# Patient Record
Sex: FEMALE | URBAN - METROPOLITAN AREA
[De-identification: names, ages, dates, MRNs, and addresses within clinical notes are randomized per-mention and may not be internally consistent; named-entity substitution may affect disease eponyms.]

---

## 2018-07-05 ENCOUNTER — OFFICE VISIT CONVERTED (OUTPATIENT)
Dept: ONCOLOGY | Facility: HOSPITAL | Age: 63
End: 2018-07-05
Attending: INTERNAL MEDICINE

## 2018-07-12 ENCOUNTER — OFFICE VISIT CONVERTED (OUTPATIENT)
Dept: ONCOLOGY | Facility: HOSPITAL | Age: 63
End: 2018-07-12
Attending: INTERNAL MEDICINE

## 2019-01-02 ENCOUNTER — HOSPITAL ENCOUNTER (OUTPATIENT)
Dept: URGENT CARE | Facility: CLINIC | Age: 64
Discharge: HOME | End: 2019-01-02
Attending: NURSE PRACTITIONER

## 2019-01-07 ENCOUNTER — HOSPITAL ENCOUNTER (OUTPATIENT)
Dept: URGENT CARE | Facility: CLINIC | Age: 64
Discharge: HOME | End: 2019-01-07

## 2019-02-06 ENCOUNTER — OFFICE VISIT CONVERTED (OUTPATIENT)
Dept: FAMILY MEDICINE CLINIC | Facility: CLINIC | Age: 64
End: 2019-02-06
Attending: FAMILY MEDICINE

## 2019-02-12 ENCOUNTER — CONVERSION ENCOUNTER (OUTPATIENT)
Dept: FAMILY MEDICINE CLINIC | Facility: CLINIC | Age: 64
End: 2019-02-12

## 2019-02-12 ENCOUNTER — OFFICE VISIT CONVERTED (OUTPATIENT)
Dept: FAMILY MEDICINE CLINIC | Facility: CLINIC | Age: 64
End: 2019-02-12
Attending: FAMILY MEDICINE

## 2019-04-10 ENCOUNTER — HOSPITAL ENCOUNTER (OUTPATIENT)
Dept: FAMILY MEDICINE CLINIC | Facility: CLINIC | Age: 64
Discharge: HOME | End: 2019-04-10
Attending: FAMILY MEDICINE

## 2019-04-10 LAB
ALBUMIN SERPL-MCNC: 4.5 G/DL (ref 3.5–5)
ALBUMIN/GLOB SERPL: 1.3 {RATIO} (ref 1.4–2.6)
ALP SERPL-CCNC: 76 U/L (ref 43–160)
ALT SERPL-CCNC: 17 U/L (ref 10–40)
ANION GAP SERPL CALC-SCNC: 15 MMOL/L (ref 8–19)
AST SERPL-CCNC: 17 U/L (ref 15–50)
BASOPHILS # BLD AUTO: 0.05 10*3/UL (ref 0–0.2)
BASOPHILS NFR BLD AUTO: 0.5 % (ref 0–3)
BILIRUB SERPL-MCNC: 0.32 MG/DL (ref 0.2–1.3)
BUN SERPL-MCNC: 13 MG/DL (ref 5–25)
BUN/CREAT SERPL: 17 {RATIO} (ref 6–20)
CALCIUM SERPL-MCNC: 10 MG/DL (ref 8.7–10.4)
CHLORIDE SERPL-SCNC: 104 MMOL/L (ref 99–111)
CONV ABS IMM GRAN: 0.04 10*3/UL (ref 0–0.2)
CONV CO2: 28 MMOL/L (ref 22–32)
CONV IMMATURE GRAN: 0.4 % (ref 0–1.8)
CONV TOTAL PROTEIN: 7.9 G/DL (ref 6.3–8.2)
CREAT UR-MCNC: 0.75 MG/DL (ref 0.5–0.9)
DEPRECATED RDW RBC AUTO: 44.8 FL (ref 36.4–46.3)
EOSINOPHIL # BLD AUTO: 0.22 10*3/UL (ref 0–0.7)
EOSINOPHIL # BLD AUTO: 2 % (ref 0–7)
ERYTHROCYTE [DISTWIDTH] IN BLOOD BY AUTOMATED COUNT: 16 % (ref 11.7–14.4)
GFR SERPLBLD BASED ON 1.73 SQ M-ARVRAT: >60 ML/MIN/{1.73_M2}
GLOBULIN UR ELPH-MCNC: 3.4 G/DL (ref 2–3.5)
GLUCOSE SERPL-MCNC: 85 MG/DL (ref 65–99)
HBA1C MFR BLD: 10.1 G/DL (ref 12–16)
HCT VFR BLD AUTO: 33.8 % (ref 37–47)
LYMPHOCYTES # BLD AUTO: 2.91 10*3/UL (ref 1–5)
MCH RBC QN AUTO: 23.1 PG (ref 27–31)
MCHC RBC AUTO-ENTMCNC: 29.9 G/DL (ref 33–37)
MCV RBC AUTO: 77.3 FL (ref 81–99)
MONOCYTES # BLD AUTO: 0.78 10*3/UL (ref 0.2–1.2)
MONOCYTES NFR BLD AUTO: 7.2 % (ref 3–10)
NEUTROPHILS # BLD AUTO: 6.82 10*3/UL (ref 2–8)
NEUTROPHILS NFR BLD AUTO: 63 % (ref 30–85)
NRBC CBCN: 0 % (ref 0–0.7)
OSMOLALITY SERPL CALC.SUM OF ELEC: 295 MOSM/KG (ref 273–304)
PLATELET # BLD AUTO: 424 10*3/UL (ref 130–400)
PMV BLD AUTO: 11.4 FL (ref 9.4–12.3)
POTASSIUM SERPL-SCNC: 4.1 MMOL/L (ref 3.5–5.3)
RBC # BLD AUTO: 4.37 10*6/UL (ref 4.2–5.4)
SODIUM SERPL-SCNC: 143 MMOL/L (ref 135–147)
VARIANT LYMPHS NFR BLD MANUAL: 26.9 % (ref 20–45)
WBC # BLD AUTO: 10.82 10*3/UL (ref 4.8–10.8)

## 2019-04-11 LAB — HCV AB SER DONR QL: <0.1 S/CO RATIO (ref 0–0.9)

## 2019-05-02 ENCOUNTER — CONVERSION ENCOUNTER (OUTPATIENT)
Dept: GASTROENTEROLOGY | Facility: CLINIC | Age: 64
End: 2019-05-02
Attending: INTERNAL MEDICINE

## 2019-05-14 ENCOUNTER — OFFICE VISIT CONVERTED (OUTPATIENT)
Dept: FAMILY MEDICINE CLINIC | Facility: CLINIC | Age: 64
End: 2019-05-14
Attending: FAMILY MEDICINE

## 2019-05-14 ENCOUNTER — CONVERSION ENCOUNTER (OUTPATIENT)
Dept: FAMILY MEDICINE CLINIC | Facility: CLINIC | Age: 64
End: 2019-05-14

## 2019-05-23 ENCOUNTER — HOSPITAL ENCOUNTER (OUTPATIENT)
Dept: GASTROENTEROLOGY | Facility: HOSPITAL | Age: 64
Setting detail: HOSPITAL OUTPATIENT SURGERY
Discharge: HOME | End: 2019-05-23
Attending: INTERNAL MEDICINE

## 2019-07-18 ENCOUNTER — HOSPITAL ENCOUNTER (OUTPATIENT)
Dept: FAMILY MEDICINE CLINIC | Facility: CLINIC | Age: 64
Discharge: HOME | End: 2019-07-18
Attending: FAMILY MEDICINE

## 2019-07-18 ENCOUNTER — OFFICE VISIT CONVERTED (OUTPATIENT)
Dept: FAMILY MEDICINE CLINIC | Facility: CLINIC | Age: 64
End: 2019-07-18
Attending: FAMILY MEDICINE

## 2019-07-18 ENCOUNTER — CONVERSION ENCOUNTER (OUTPATIENT)
Dept: FAMILY MEDICINE CLINIC | Facility: CLINIC | Age: 64
End: 2019-07-18

## 2019-07-23 ENCOUNTER — HOSPITAL ENCOUNTER (OUTPATIENT)
Dept: ONCOLOGY | Facility: HOSPITAL | Age: 64
Discharge: HOME | End: 2019-07-23
Attending: INTERNAL MEDICINE

## 2019-07-23 ENCOUNTER — OFFICE VISIT CONVERTED (OUTPATIENT)
Dept: ONCOLOGY | Facility: HOSPITAL | Age: 64
End: 2019-07-23
Attending: INTERNAL MEDICINE

## 2019-07-23 LAB
ALBUMIN SERPL-MCNC: 4.2 G/DL (ref 3.5–5)
ALBUMIN/GLOB SERPL: 1.2 {RATIO} (ref 1.4–2.6)
ALP SERPL-CCNC: 95 U/L (ref 43–160)
ALT SERPL-CCNC: 19 U/L (ref 10–40)
ANION GAP SERPL CALC-SCNC: 15 MMOL/L (ref 8–19)
AST SERPL-CCNC: 18 U/L (ref 15–50)
BASOPHILS # BLD AUTO: 0.05 10*3/UL (ref 0–0.2)
BASOPHILS # BLD: 1 % (ref 0–3)
BASOPHILS NFR BLD AUTO: 0.4 % (ref 0–3)
BILIRUB SERPL-MCNC: 0.32 MG/DL (ref 0.2–1.3)
BUN SERPL-MCNC: 10 MG/DL (ref 5–25)
BUN/CREAT SERPL: 13 {RATIO} (ref 6–20)
CALCIUM SERPL-MCNC: 9.7 MG/DL (ref 8.7–10.4)
CHLORIDE SERPL-SCNC: 104 MMOL/L (ref 99–111)
CHOLEST SERPL-MCNC: 149 MG/DL (ref 107–200)
CHOLEST/HDLC SERPL: 2.5 {RATIO} (ref 3–6)
CONV ABS BANDS: 0 % (ref 1–5)
CONV ABS IMM GRAN: 0.06 10*3/UL (ref 0–0.2)
CONV ANISOCYTES: ABNORMAL
CONV ATYPICAL LYMPHOCYTES: 5 % (ref 0–5)
CONV CO2: 27 MMOL/L (ref 22–32)
CONV HYPOCHROMIA IN BLOOD BY LIGHT MICROSCOPY: SLIGHT
CONV IMMATURE GRAN: 0.5 % (ref 0–1.8)
CONV SEGMENTED NEUTROPHILS: 59 % (ref 45–70)
CONV TOTAL PROTEIN: 7.6 G/DL (ref 6.3–8.2)
CREAT UR-MCNC: 0.8 MG/DL (ref 0.5–0.9)
DEPRECATED RDW RBC AUTO: 43.7 FL (ref 36.4–46.3)
EOSINOPHIL # BLD AUTO: 0.36 10*3/UL (ref 0–0.7)
EOSINOPHIL # BLD AUTO: 2.8 % (ref 0–7)
EOSINOPHIL NFR BLD AUTO: 5 % (ref 0–7)
ERYTHROCYTE [DISTWIDTH] IN BLOOD BY AUTOMATED COUNT: 16.2 % (ref 11.7–14.4)
FERRITIN SERPL-MCNC: 172 NG/ML (ref 10–200)
FOLATE SERPL-MCNC: >20 NG/ML (ref 4.8–20)
GFR SERPLBLD BASED ON 1.73 SQ M-ARVRAT: >60 ML/MIN/{1.73_M2}
GLOBULIN UR ELPH-MCNC: 3.4 G/DL (ref 2–3.5)
GLUCOSE SERPL-MCNC: 82 MG/DL (ref 65–99)
HBA1C MFR BLD: 10.1 G/DL (ref 12–16)
HCT VFR BLD AUTO: 33.4 % (ref 37–47)
HDLC SERPL-MCNC: 59 MG/DL (ref 40–60)
IRON SATN MFR SERPL: 16 % (ref 20–55)
IRON SERPL-MCNC: 53 UG/DL (ref 60–170)
LDH SERPL-CCNC: 166 U/L (ref 120–240)
LDLC SERPL CALC-MCNC: 77 MG/DL (ref 70–100)
LYMPHOCYTES # BLD AUTO: 3.26 10*3/UL (ref 1–5)
MCH RBC QN AUTO: 22.7 PG (ref 27–31)
MCHC RBC AUTO-ENTMCNC: 30.2 G/DL (ref 33–37)
MCV RBC AUTO: 75.1 FL (ref 81–99)
MICROCYTES BLD QL: SLIGHT
MONOCYTES # BLD AUTO: 0.78 10*3/UL (ref 0.2–1.2)
MONOCYTES NFR BLD AUTO: 6.1 % (ref 3–10)
MONOCYTES NFR BLD MANUAL: 1 % (ref 3–10)
NEUTROPHILS # BLD AUTO: 8.22 10*3/UL (ref 2–8)
NEUTROPHILS NFR BLD AUTO: 64.6 % (ref 30–85)
NRBC CBCN: 0 % (ref 0–0.7)
NUC CELL # PRT MANUAL: 0 /100{WBCS}
OSMOLALITY SERPL CALC.SUM OF ELEC: 294 MOSM/KG (ref 273–304)
OVALOCYTES BLD QL SMEAR: SLIGHT
PLAT MORPH BLD: NORMAL
PLATELET # BLD AUTO: 383 10*3/UL (ref 130–400)
PMV BLD AUTO: 10.9 FL (ref 9.4–12.3)
POIKILOCYTOSIS BLD QL SMEAR: SLIGHT
POLYCHROMASIA BLD QL SMEAR: SLIGHT
POTASSIUM SERPL-SCNC: 3.2 MMOL/L (ref 3.5–5.3)
RBC # BLD AUTO: 4.45 10*6/UL (ref 4.2–5.4)
SMALL PLATELETS BLD QL SMEAR: ADEQUATE
SODIUM SERPL-SCNC: 143 MMOL/L (ref 135–147)
T4 FREE SERPL-MCNC: 1.3 NG/DL (ref 0.9–1.8)
TARGETS BLD QL SMEAR: SLIGHT
TIBC SERPL-MCNC: 329 UG/DL (ref 245–450)
TRANSFERRIN SERPL-MCNC: 230 MG/DL (ref 250–380)
TRIGL SERPL-MCNC: 63 MG/DL (ref 40–150)
TSH SERPL-ACNC: 1.26 M[IU]/L (ref 0.27–4.2)
VARIANT LYMPHS NFR BLD MANUAL: 25.6 % (ref 20–45)
VARIANT LYMPHS NFR BLD MANUAL: 29 % (ref 20–45)
VIT B12 SERPL-MCNC: 568 PG/ML (ref 211–911)
VLDLC SERPL-MCNC: 13 MG/DL (ref 5–37)
WBC # BLD AUTO: 12.73 10*3/UL (ref 4.8–10.8)

## 2019-07-24 LAB
ALBUMIN SERPL-MCNC: 3.7 G/DL (ref 2.9–4.4)
ALBUMIN/GLOB SERPL: 1.1 {RATIO} (ref 0.7–1.7)
ALPHA2 GLOB SERPL ELPH-MCNC: 0.7 G/DL (ref 0.4–1)
BETA GLOBULIN: 1.3 G/DL (ref 0.7–1.3)
CONV ALPHA-1-GLOBULIN: 0.2 G/DL (ref 0–0.4)
CONV IMMUNOGLOBULIN G (IGG): 1103 MG/DL (ref 700–1600)
CONV IMMUNOGLOBULIN M (IGM): 53 MG/DL (ref 26–217)
CONV PE INTERPRETATION: ABNORMAL
CONV PE NOTE: ABNORMAL
CONV TOTAL PROTEIN: 7 G/DL (ref 6–8.5)
GAMMA GLOB SERPL ELPH-MCNC: 1.1 G/DL (ref 0.4–1.8)
GLOBULIN UR ELPH-MCNC: 3.3 G/DL (ref 2.2–3.9)
IGA SERPL-MCNC: 358 MG/DL (ref 87–352)
M-SPIKE: ABNORMAL G/DL
PROT PATTERN SERPL IFE-IMP: ABNORMAL

## 2019-07-25 LAB — 1,25(OH)2D3 SERPL-MCNC: 26.5 PG/ML (ref 19.9–79.3)

## 2019-07-26 LAB
ALBUMIN 24H MFR UR ELPH: 57.9 %
ALPHA1 GLOB 24H MFR UR ELPH: 4.1 %
ALPHA2 GLOB 24H MFR UR ELPH: 10.2 %
B-GLOBULIN MFR UR ELPH: 17.6 %
CONV IMMUNOFIXATION (URINE): NORMAL
CONV PE NOTE: NORMAL
GAMMA GLOB 24H MFR UR ELPH: 10.2 %
M PROTEIN MFR UR ELPH: NORMAL %
PROT UR-MCNC: 15.1 MG/DL

## 2019-07-30 ENCOUNTER — HOSPITAL ENCOUNTER (OUTPATIENT)
Dept: URGENT CARE | Facility: CLINIC | Age: 64
Discharge: HOME | End: 2019-07-30

## 2019-08-01 ENCOUNTER — OFFICE VISIT CONVERTED (OUTPATIENT)
Dept: FAMILY MEDICINE CLINIC | Facility: CLINIC | Age: 64
End: 2019-08-01
Attending: FAMILY MEDICINE

## 2019-08-14 ENCOUNTER — OFFICE VISIT CONVERTED (OUTPATIENT)
Dept: FAMILY MEDICINE CLINIC | Facility: CLINIC | Age: 64
End: 2019-08-14
Attending: FAMILY MEDICINE

## 2019-08-15 ENCOUNTER — HOSPITAL ENCOUNTER (OUTPATIENT)
Dept: OTHER | Facility: HOSPITAL | Age: 64
Discharge: HOME | End: 2019-08-15
Attending: FAMILY MEDICINE

## 2019-08-15 LAB
ALBUMIN SERPL-MCNC: 4.1 G/DL (ref 3.5–5)
ALBUMIN/GLOB SERPL: 1.3 {RATIO} (ref 1.4–2.6)
ALP SERPL-CCNC: 88 U/L (ref 43–160)
ALT SERPL-CCNC: 22 U/L (ref 10–40)
ANION GAP SERPL CALC-SCNC: 16 MMOL/L (ref 8–19)
AST SERPL-CCNC: 17 U/L (ref 15–50)
BASOPHILS # BLD AUTO: 0.05 10*3/UL (ref 0–0.2)
BASOPHILS NFR BLD AUTO: 0.3 % (ref 0–3)
BILIRUB SERPL-MCNC: 0.39 MG/DL (ref 0.2–1.3)
BUN SERPL-MCNC: 13 MG/DL (ref 5–25)
BUN/CREAT SERPL: 18 {RATIO} (ref 6–20)
CALCIUM SERPL-MCNC: 9.5 MG/DL (ref 8.7–10.4)
CHLORIDE SERPL-SCNC: 104 MMOL/L (ref 99–111)
CONV ABS IMM GRAN: 0.05 10*3/UL (ref 0–0.2)
CONV CO2: 28 MMOL/L (ref 22–32)
CONV IMMATURE GRAN: 0.3 % (ref 0–1.8)
CONV TOTAL PROTEIN: 7.3 G/DL (ref 6.3–8.2)
CREAT UR-MCNC: 0.73 MG/DL (ref 0.5–0.9)
DEPRECATED RDW RBC AUTO: 44.9 FL (ref 36.4–46.3)
EOSINOPHIL # BLD AUTO: 0.27 10*3/UL (ref 0–0.7)
EOSINOPHIL # BLD AUTO: 1.7 % (ref 0–7)
ERYTHROCYTE [DISTWIDTH] IN BLOOD BY AUTOMATED COUNT: 16.9 % (ref 11.7–14.4)
GFR SERPLBLD BASED ON 1.73 SQ M-ARVRAT: >60 ML/MIN/{1.73_M2}
GLOBULIN UR ELPH-MCNC: 3.2 G/DL (ref 2–3.5)
GLUCOSE SERPL-MCNC: 78 MG/DL (ref 65–99)
HCT VFR BLD AUTO: 33.5 % (ref 37–47)
HGB BLD-MCNC: 10.1 G/DL (ref 12–16)
LYMPHOCYTES # BLD AUTO: 4.02 10*3/UL (ref 1–5)
LYMPHOCYTES NFR BLD AUTO: 25.8 % (ref 20–45)
MCH RBC QN AUTO: 22.7 PG (ref 27–31)
MCHC RBC AUTO-ENTMCNC: 30.1 G/DL (ref 33–37)
MCV RBC AUTO: 75.5 FL (ref 81–99)
MONOCYTES # BLD AUTO: 1.1 10*3/UL (ref 0.2–1.2)
MONOCYTES NFR BLD AUTO: 7.1 % (ref 3–10)
NEUTROPHILS # BLD AUTO: 10.09 10*3/UL (ref 2–8)
NEUTROPHILS NFR BLD AUTO: 64.8 % (ref 30–85)
NRBC CBCN: 0 % (ref 0–0.7)
OSMOLALITY SERPL CALC.SUM OF ELEC: 297 MOSM/KG (ref 273–304)
PLATELET # BLD AUTO: 409 10*3/UL (ref 130–400)
PMV BLD AUTO: 10.8 FL (ref 9.4–12.3)
POTASSIUM SERPL-SCNC: 4 MMOL/L (ref 3.5–5.3)
RBC # BLD AUTO: 4.44 10*6/UL (ref 4.2–5.4)
SODIUM SERPL-SCNC: 144 MMOL/L (ref 135–147)
WBC # BLD AUTO: 15.58 10*3/UL (ref 4.8–10.8)

## 2019-08-20 LAB
ALDOST SERPL-MCNC: 5.8 NG/DL (ref 0–30)
ALDOST/RENIN PLAS-RTO: 25.8 {RATIO} (ref 0–30)
RENIN PLAS-CCNC: 0.23 NG/ML/HR (ref 0.17–5.38)

## 2019-08-27 ENCOUNTER — HOSPITAL ENCOUNTER (OUTPATIENT)
Dept: ONCOLOGY | Facility: HOSPITAL | Age: 64
Discharge: HOME | End: 2019-08-27

## 2019-08-27 ENCOUNTER — OFFICE VISIT CONVERTED (OUTPATIENT)
Dept: ONCOLOGY | Facility: HOSPITAL | Age: 64
End: 2019-08-27

## 2019-08-27 LAB
BASOPHILS # BLD AUTO: 0.03 10*3/UL (ref 0–0.2)
BASOPHILS NFR BLD AUTO: 0.2 % (ref 0–3)
CONV ABS IMM GRAN: 0.03 10*3/UL (ref 0–0.2)
CONV IMMATURE GRAN: 0.2 % (ref 0–1.8)
DEPRECATED RDW RBC AUTO: 45.8 FL (ref 36.4–46.3)
EOSINOPHIL # BLD AUTO: 0.24 10*3/UL (ref 0–0.7)
EOSINOPHIL # BLD AUTO: 1.9 % (ref 0–7)
ERYTHROCYTE [DISTWIDTH] IN BLOOD BY AUTOMATED COUNT: 17.1 % (ref 11.7–14.4)
HCT VFR BLD AUTO: 33.3 % (ref 37–47)
HGB BLD-MCNC: 10.1 G/DL (ref 12–16)
LYMPHOCYTES # BLD AUTO: 3 10*3/UL (ref 1–5)
LYMPHOCYTES NFR BLD AUTO: 24.1 % (ref 20–45)
MCH RBC QN AUTO: 23 PG (ref 27–31)
MCHC RBC AUTO-ENTMCNC: 30.3 G/DL (ref 33–37)
MCV RBC AUTO: 75.9 FL (ref 81–99)
MONOCYTES # BLD AUTO: 1 10*3/UL (ref 0.2–1.2)
MONOCYTES NFR BLD AUTO: 8 % (ref 3–10)
NEUTROPHILS # BLD AUTO: 8.17 10*3/UL (ref 2–8)
NEUTROPHILS NFR BLD AUTO: 65.6 % (ref 30–85)
NRBC CBCN: 0 % (ref 0–0.7)
PLATELET # BLD AUTO: 413 10*3/UL (ref 130–400)
PMV BLD AUTO: 10.3 FL (ref 9.4–12.3)
RBC # BLD AUTO: 4.39 10*6/UL (ref 4.2–5.4)
RETICS # AUTO: 0.07 10*6/UL (ref 0.02–0.08)
RETICS/RBC NFR AUTO: 1.57 % (ref 0.5–1.7)
WBC # BLD AUTO: 12.47 10*3/UL (ref 4.8–10.8)

## 2019-08-28 LAB
CONV HGB ELECTROPHORESIS INTERPRETATION: NORMAL
HEMOGLOBIN A: 98.1 % (ref 96.4–98.8)
HGB A2 MFR BLD COLUMN CHROM: 1.9 % (ref 1.8–3.2)
HGB C MFR BLD: 0 %
HGB F MFR BLD HPLC: 0 % (ref 0–2)
HGB S BLD QL SOLY: NEGATIVE
HGB S MFR BLD: 0 %

## 2019-08-29 ENCOUNTER — HOSPITAL ENCOUNTER (OUTPATIENT)
Dept: OTHER | Facility: HOSPITAL | Age: 64
Discharge: HOME | End: 2019-08-29
Attending: NURSE PRACTITIONER

## 2019-08-29 ENCOUNTER — OFFICE VISIT CONVERTED (OUTPATIENT)
Dept: FAMILY MEDICINE CLINIC | Facility: CLINIC | Age: 64
End: 2019-08-29
Attending: NURSE PRACTITIONER

## 2019-09-18 ENCOUNTER — HOSPITAL ENCOUNTER (OUTPATIENT)
Dept: FAMILY MEDICINE CLINIC | Facility: CLINIC | Age: 64
Discharge: HOME | End: 2019-09-18
Attending: FAMILY MEDICINE

## 2019-09-18 ENCOUNTER — OFFICE VISIT CONVERTED (OUTPATIENT)
Dept: ONCOLOGY | Facility: HOSPITAL | Age: 64
End: 2019-09-18

## 2019-09-18 ENCOUNTER — HOSPITAL ENCOUNTER (OUTPATIENT)
Dept: ONCOLOGY | Facility: HOSPITAL | Age: 64
Discharge: HOME | End: 2019-09-18

## 2019-09-18 LAB
ANION GAP SERPL CALC-SCNC: 18 MMOL/L (ref 8–19)
BUN SERPL-MCNC: 7 MG/DL (ref 5–25)
BUN/CREAT SERPL: 9 {RATIO} (ref 6–20)
CALCIUM SERPL-MCNC: 9.4 MG/DL (ref 8.7–10.4)
CHLORIDE SERPL-SCNC: 103 MMOL/L (ref 99–111)
CONV CO2: 24 MMOL/L (ref 22–32)
CREAT UR-MCNC: 0.79 MG/DL (ref 0.5–0.9)
GFR SERPLBLD BASED ON 1.73 SQ M-ARVRAT: >60 ML/MIN/{1.73_M2}
GLUCOSE SERPL-MCNC: 111 MG/DL (ref 65–99)
OSMOLALITY SERPL CALC.SUM OF ELEC: 293 MOSM/KG (ref 273–304)
POTASSIUM SERPL-SCNC: 3.3 MMOL/L (ref 3.5–5.3)
SODIUM SERPL-SCNC: 142 MMOL/L (ref 135–147)

## 2019-09-25 ENCOUNTER — OFFICE VISIT CONVERTED (OUTPATIENT)
Dept: FAMILY MEDICINE CLINIC | Facility: CLINIC | Age: 64
End: 2019-09-25
Attending: FAMILY MEDICINE

## 2019-10-04 ENCOUNTER — HOSPITAL ENCOUNTER (OUTPATIENT)
Dept: MRI IMAGING | Facility: HOSPITAL | Age: 64
Discharge: HOME | End: 2019-10-04
Attending: FAMILY MEDICINE

## 2019-10-09 ENCOUNTER — HOSPITAL ENCOUNTER (OUTPATIENT)
Dept: PHYSICAL THERAPY | Facility: CLINIC | Age: 64
Setting detail: RECURRING SERIES
Discharge: HOME | End: 2020-01-06
Attending: FAMILY MEDICINE

## 2019-10-22 ENCOUNTER — OFFICE VISIT CONVERTED (OUTPATIENT)
Dept: FAMILY MEDICINE CLINIC | Facility: CLINIC | Age: 64
End: 2019-10-22
Attending: FAMILY MEDICINE

## 2019-11-20 ENCOUNTER — HOSPITAL ENCOUNTER (OUTPATIENT)
Dept: ONCOLOGY | Facility: HOSPITAL | Age: 64
Discharge: HOME | End: 2019-11-20

## 2019-11-20 ENCOUNTER — OFFICE VISIT CONVERTED (OUTPATIENT)
Dept: ONCOLOGY | Facility: HOSPITAL | Age: 64
End: 2019-11-20

## 2019-11-20 ENCOUNTER — HOSPITAL ENCOUNTER (OUTPATIENT)
Dept: FAMILY MEDICINE CLINIC | Facility: CLINIC | Age: 64
Discharge: HOME | End: 2019-11-20
Attending: FAMILY MEDICINE

## 2019-11-20 LAB
ALBUMIN SERPL-MCNC: 4.2 G/DL (ref 3.5–5)
ALBUMIN/GLOB SERPL: 1.2 {RATIO} (ref 1.4–2.6)
ALP SERPL-CCNC: 76 U/L (ref 43–160)
ALT SERPL-CCNC: 21 U/L (ref 10–40)
ANION GAP SERPL CALC-SCNC: 17 MMOL/L (ref 8–19)
AST SERPL-CCNC: 20 U/L (ref 15–50)
BASOPHILS # BLD AUTO: 0.06 10*3/UL (ref 0–0.2)
BASOPHILS # BLD AUTO: 0.07 10*3/UL (ref 0–0.2)
BASOPHILS NFR BLD AUTO: 0.5 % (ref 0–3)
BASOPHILS NFR BLD AUTO: 0.5 % (ref 0–3)
BILIRUB SERPL-MCNC: 0.34 MG/DL (ref 0.2–1.3)
BUN SERPL-MCNC: 15 MG/DL (ref 5–25)
BUN/CREAT SERPL: 17 {RATIO} (ref 6–20)
CALCIUM SERPL-MCNC: 9.7 MG/DL (ref 8.7–10.4)
CHLORIDE SERPL-SCNC: 101 MMOL/L (ref 99–111)
CHOLEST SERPL-MCNC: 166 MG/DL (ref 107–200)
CHOLEST/HDLC SERPL: 2.6 {RATIO} (ref 3–6)
CONV ABS IMM GRAN: 0.05 10*3/UL (ref 0–0.2)
CONV ABS IMM GRAN: 0.05 10*3/UL (ref 0–0.2)
CONV CO2: 26 MMOL/L (ref 22–32)
CONV IMMATURE GRAN: 0.4 % (ref 0–1.8)
CONV IMMATURE GRAN: 0.4 % (ref 0–1.8)
CONV TOTAL PROTEIN: 7.8 G/DL (ref 6.3–8.2)
CREAT UR-MCNC: 0.86 MG/DL (ref 0.5–0.9)
DEPRECATED RDW RBC AUTO: 45.5 FL (ref 36.4–46.3)
DEPRECATED RDW RBC AUTO: 47.3 FL (ref 36.4–46.3)
EOSINOPHIL # BLD AUTO: 0.19 10*3/UL (ref 0–0.7)
EOSINOPHIL # BLD AUTO: 0.23 10*3/UL (ref 0–0.7)
EOSINOPHIL # BLD AUTO: 1.4 % (ref 0–7)
EOSINOPHIL # BLD AUTO: 1.8 % (ref 0–7)
ERYTHROCYTE [DISTWIDTH] IN BLOOD BY AUTOMATED COUNT: 16.3 % (ref 11.7–14.4)
ERYTHROCYTE [DISTWIDTH] IN BLOOD BY AUTOMATED COUNT: 16.7 % (ref 11.7–14.4)
EST. AVERAGE GLUCOSE BLD GHB EST-MCNC: 108 MG/DL
FERRITIN SERPL-MCNC: 181 NG/ML (ref 10–200)
GFR SERPLBLD BASED ON 1.73 SQ M-ARVRAT: >60 ML/MIN/{1.73_M2}
GLOBULIN UR ELPH-MCNC: 3.6 G/DL (ref 2–3.5)
GLUCOSE SERPL-MCNC: 82 MG/DL (ref 65–99)
HBA1C MFR BLD: 5.4 % (ref 3.5–5.7)
HCT VFR BLD AUTO: 35.2 % (ref 37–47)
HCT VFR BLD AUTO: 35.6 % (ref 37–47)
HDLC SERPL-MCNC: 64 MG/DL (ref 40–60)
HGB BLD-MCNC: 10.4 G/DL (ref 12–16)
HGB BLD-MCNC: 10.5 G/DL (ref 12–16)
IRON SATN MFR SERPL: 20 % (ref 20–55)
IRON SERPL-MCNC: 74 UG/DL (ref 60–170)
LDLC SERPL CALC-MCNC: 82 MG/DL (ref 70–100)
LYMPHOCYTES # BLD AUTO: 3.56 10*3/UL (ref 1–5)
LYMPHOCYTES # BLD AUTO: 3.86 10*3/UL (ref 1–5)
LYMPHOCYTES NFR BLD AUTO: 28 % (ref 20–45)
LYMPHOCYTES NFR BLD AUTO: 29.1 % (ref 20–45)
MCH RBC QN AUTO: 23.1 PG (ref 27–31)
MCH RBC QN AUTO: 23.1 PG (ref 27–31)
MCHC RBC AUTO-ENTMCNC: 29.2 G/DL (ref 33–37)
MCHC RBC AUTO-ENTMCNC: 29.8 G/DL (ref 33–37)
MCV RBC AUTO: 77.4 FL (ref 81–99)
MCV RBC AUTO: 78.9 FL (ref 81–99)
MONOCYTES # BLD AUTO: 0.88 10*3/UL (ref 0.2–1.2)
MONOCYTES # BLD AUTO: 1.02 10*3/UL (ref 0.2–1.2)
MONOCYTES NFR BLD AUTO: 6.9 % (ref 3–10)
MONOCYTES NFR BLD AUTO: 7.7 % (ref 3–10)
NEUTROPHILS # BLD AUTO: 7.93 10*3/UL (ref 2–8)
NEUTROPHILS # BLD AUTO: 8.06 10*3/UL (ref 2–8)
NEUTROPHILS NFR BLD AUTO: 60.9 % (ref 30–85)
NEUTROPHILS NFR BLD AUTO: 62.4 % (ref 30–85)
NRBC CBCN: 0 % (ref 0–0.7)
NRBC CBCN: 0 % (ref 0–0.7)
OSMOLALITY SERPL CALC.SUM OF ELEC: 290 MOSM/KG (ref 273–304)
PLATELET # BLD AUTO: 422 10*3/UL (ref 130–400)
PLATELET # BLD AUTO: 426 10*3/UL (ref 130–400)
PMV BLD AUTO: 10.6 FL (ref 9.4–12.3)
PMV BLD AUTO: 11.4 FL (ref 9.4–12.3)
POTASSIUM SERPL-SCNC: 3.6 MMOL/L (ref 3.5–5.3)
RBC # BLD AUTO: 4.51 10*6/UL (ref 4.2–5.4)
RBC # BLD AUTO: 4.55 10*6/UL (ref 4.2–5.4)
SODIUM SERPL-SCNC: 140 MMOL/L (ref 135–147)
T4 FREE SERPL-MCNC: 1.2 NG/DL (ref 0.9–1.8)
TIBC SERPL-MCNC: 366 UG/DL (ref 245–450)
TRANSFERRIN SERPL-MCNC: 256 MG/DL (ref 250–380)
TRIGL SERPL-MCNC: 101 MG/DL (ref 40–150)
TSH SERPL-ACNC: 0.84 M[IU]/L (ref 0.27–4.2)
VLDLC SERPL-MCNC: 20 MG/DL (ref 5–37)
WBC # BLD AUTO: 12.71 10*3/UL (ref 4.8–10.8)
WBC # BLD AUTO: 13.25 10*3/UL (ref 4.8–10.8)

## 2019-11-25 ENCOUNTER — CONVERSION ENCOUNTER (OUTPATIENT)
Dept: FAMILY MEDICINE CLINIC | Facility: CLINIC | Age: 64
End: 2019-11-25

## 2019-11-25 ENCOUNTER — OFFICE VISIT CONVERTED (OUTPATIENT)
Dept: FAMILY MEDICINE CLINIC | Facility: CLINIC | Age: 64
End: 2019-11-25
Attending: FAMILY MEDICINE

## 2019-12-02 ENCOUNTER — OFFICE VISIT CONVERTED (OUTPATIENT)
Dept: FAMILY MEDICINE CLINIC | Facility: CLINIC | Age: 64
End: 2019-12-02
Attending: FAMILY MEDICINE

## 2020-01-09 ENCOUNTER — OFFICE VISIT CONVERTED (OUTPATIENT)
Dept: FAMILY MEDICINE CLINIC | Facility: CLINIC | Age: 65
End: 2020-01-09
Attending: FAMILY MEDICINE

## 2020-01-22 ENCOUNTER — HOSPITAL ENCOUNTER (OUTPATIENT)
Dept: FAMILY MEDICINE CLINIC | Facility: CLINIC | Age: 65
Discharge: HOME | End: 2020-01-22
Attending: FAMILY MEDICINE

## 2020-01-22 LAB
BASOPHILS # BLD AUTO: 0.04 10*3/UL (ref 0–0.2)
BASOPHILS NFR BLD AUTO: 0.4 % (ref 0–3)
CHOLEST SERPL-MCNC: 155 MG/DL (ref 107–200)
CHOLEST/HDLC SERPL: 2.6 {RATIO} (ref 3–6)
CONV ABS IMM GRAN: 0.03 10*3/UL (ref 0–0.2)
CONV IMMATURE GRAN: 0.3 % (ref 0–1.8)
DEPRECATED RDW RBC AUTO: 44.3 FL (ref 36.4–46.3)
EOSINOPHIL # BLD AUTO: 0.42 10*3/UL (ref 0–0.7)
EOSINOPHIL # BLD AUTO: 3.7 % (ref 0–7)
ERYTHROCYTE [DISTWIDTH] IN BLOOD BY AUTOMATED COUNT: 16.1 % (ref 11.7–14.4)
HCT VFR BLD AUTO: 33.1 % (ref 37–47)
HDLC SERPL-MCNC: 59 MG/DL (ref 40–60)
HGB BLD-MCNC: 10.1 G/DL (ref 12–16)
LDLC SERPL CALC-MCNC: 83 MG/DL (ref 70–100)
LYMPHOCYTES # BLD AUTO: 3.56 10*3/UL (ref 1–5)
LYMPHOCYTES NFR BLD AUTO: 31.7 % (ref 20–45)
MCH RBC QN AUTO: 23.5 PG (ref 27–31)
MCHC RBC AUTO-ENTMCNC: 30.5 G/DL (ref 33–37)
MCV RBC AUTO: 77 FL (ref 81–99)
MONOCYTES # BLD AUTO: 0.83 10*3/UL (ref 0.2–1.2)
MONOCYTES NFR BLD AUTO: 7.4 % (ref 3–10)
NEUTROPHILS # BLD AUTO: 6.36 10*3/UL (ref 2–8)
NEUTROPHILS NFR BLD AUTO: 56.5 % (ref 30–85)
NRBC CBCN: 0 % (ref 0–0.7)
PLATELET # BLD AUTO: 364 10*3/UL (ref 130–400)
PMV BLD AUTO: 11.4 FL (ref 9.4–12.3)
RBC # BLD AUTO: 4.3 10*6/UL (ref 4.2–5.4)
TRIGL SERPL-MCNC: 66 MG/DL (ref 40–150)
VLDLC SERPL-MCNC: 13 MG/DL (ref 5–37)
WBC # BLD AUTO: 11.24 10*3/UL (ref 4.8–10.8)

## 2020-01-23 LAB
IRON SATN MFR SERPL: 17 % (ref 20–55)
IRON SERPL-MCNC: 61 UG/DL (ref 60–170)
TIBC SERPL-MCNC: 365 UG/DL (ref 245–450)
TRANSFERRIN SERPL-MCNC: 255 MG/DL (ref 250–380)

## 2020-01-25 LAB
CONV PNEUMOCOCCAL SEROTYPE 12F: 0.09 UG/ML
CONV PNEUMOCOCCAL SEROTYPE 14: 2.7 UG/ML
CONV PNEUMOCOCCAL SEROTYPE 15B: 0.56 UG/ML
CONV PNEUMOCOCCAL SEROTYPE 17F: 3.46 UG/ML
CONV PNEUMOCOCCAL SEROTYPE 18C: 0.93 UG/ML
CONV PNEUMOCOCCAL SEROTYPE 19A: 22.16 UG/ML
CONV PNEUMOCOCCAL SEROTYPE 19F: 2.12 UG/ML
CONV PNEUMOCOCCAL SEROTYPE 1: 13.24 UG/ML
CONV PNEUMOCOCCAL SEROTYPE 20: 6.66 UG/ML
CONV PNEUMOCOCCAL SEROTYPE 22: 0.99 UG/ML
CONV PNEUMOCOCCAL SEROTYPE 23: 0.08 UG/ML
CONV PNEUMOCOCCAL SEROTYPE 2: 7.41 UG/ML
CONV PNEUMOCOCCAL SEROTYPE 33F: 1.41 UG/ML
CONV PNEUMOCOCCAL SEROTYPE 3: 0.42 UG/ML
CONV PNEUMOCOCCAL SEROTYPE 4: 2.33 UG/ML
CONV PNEUMOCOCCAL SEROTYPE 5: 11.74 UG/ML
CONV PNEUMOCOCCAL SEROTYPE 6B: 1.64 UG/ML
CONV PNEUMOCOCCAL SEROTYPE 7: 9.08 UG/ML
CONV PNEUMOCOCCAL SEROTYPE 8: 0.93 UG/ML
CONV PNEUMOCOCCAL SEROTYPE 9N: 0.93 UG/ML
CONV PNEUMOCOCCAL SEROTYPE 9V: 11.37 UG/ML
CONV PNEUMOCOCCAL SEROTYPE INTERPRETATION: NORMAL
CONV STREPTOCOCCUS PNEUMONIAE DANISH SEROTYPE 10A IGG AB: 3.36 UG/ML
CONV STREPTOCOCCUS PNEUMONIAE DANISH SEROTYPE 11A IGG AB: 6.2 UG/ML

## 2020-01-28 ENCOUNTER — OFFICE VISIT CONVERTED (OUTPATIENT)
Dept: FAMILY MEDICINE CLINIC | Facility: CLINIC | Age: 65
End: 2020-01-28
Attending: FAMILY MEDICINE

## 2020-01-29 ENCOUNTER — CONVERSION ENCOUNTER (OUTPATIENT)
Dept: FAMILY MEDICINE CLINIC | Facility: CLINIC | Age: 65
End: 2020-01-29

## 2020-01-29 ENCOUNTER — OFFICE VISIT CONVERTED (OUTPATIENT)
Dept: FAMILY MEDICINE CLINIC | Facility: CLINIC | Age: 65
End: 2020-01-29
Attending: NURSE PRACTITIONER

## 2020-02-20 ENCOUNTER — HOSPITAL ENCOUNTER (OUTPATIENT)
Dept: ONCOLOGY | Facility: HOSPITAL | Age: 65
Discharge: HOME | End: 2020-02-20
Attending: INTERNAL MEDICINE

## 2020-02-20 ENCOUNTER — OFFICE VISIT CONVERTED (OUTPATIENT)
Dept: ONCOLOGY | Facility: HOSPITAL | Age: 65
End: 2020-02-20
Attending: INTERNAL MEDICINE

## 2020-02-20 LAB
BASOPHILS # BLD AUTO: 0.03 10*3/UL (ref 0–0.2)
BASOPHILS NFR BLD AUTO: 0.2 % (ref 0–3)
CONV ABS IMM GRAN: 0.02 10*3/UL (ref 0–0.54)
CONV EOSINOPHILS PERCENT BY MANUAL COUNT: 1.3 % (ref 0–7)
CONV IMMATURE GRAN: 0.1 % (ref 0–0.4)
EOSINOPHIL # BLD MANUAL: 0.19 10*3/UL (ref 0–0.7)
ERYTHROCYTE [DISTWIDTH] IN BLOOD BY AUTOMATED COUNT: 16.6 % (ref 11.5–14.5)
ERYTHROCYTE [DISTWIDTH] IN BLOOD BY AUTOMATED COUNT: 45.3 FL
FERRITIN SERPL-MCNC: 243 NG/ML (ref 10–200)
HBA1C MFR BLD: 10.6 G/DL (ref 12–16)
HCT VFR BLD AUTO: 34.3 % (ref 37–47)
IRON SATN MFR SERPL: 11 % (ref 20–55)
IRON SERPL-MCNC: 43 UG/DL (ref 60–170)
LYMPHOCYTES # BLD AUTO: 3.4 10*3/UL (ref 1–5)
LYMPHOCYTES NFR BLD AUTO: 22.4 % (ref 20–45)
MCH RBC QN AUTO: 23.5 PG (ref 27–31)
MCHC RBC AUTO-ENTMCNC: 30.9 G/DL (ref 33–37)
MCV RBC AUTO: 75.9 FL (ref 81–99)
MONOCYTES # BLD AUTO: 1.03 10*3/UL (ref 0.2–1.2)
MONOCYTES NFR BLD MANUAL: 6.8 % (ref 3–10)
NEUTROPHILS # BLD AUTO: 10.48 10*3/UL (ref 2–8)
NEUTROPHILS NFR BLD MANUAL: 69.2 % (ref 30–85)
PLATELET # BLD AUTO: 435 10*3/UL (ref 130–400)
PMV BLD AUTO: 9.4 FL (ref 7.4–10.4)
RBC MORPH BLD: 4.52 10*6/UL (ref 4.2–5.4)
TIBC SERPL-MCNC: 383 UG/DL (ref 245–450)
TRANSFERRIN SERPL-MCNC: 268 MG/DL (ref 250–380)
WBC # BLD AUTO: 15.15 10*3/UL (ref 4.8–10.8)

## 2020-02-21 ENCOUNTER — HOSPITAL ENCOUNTER (OUTPATIENT)
Dept: NUCLEAR MEDICINE | Facility: HOSPITAL | Age: 65
Discharge: HOME | End: 2020-02-21
Attending: SPECIALIST

## 2020-02-25 ENCOUNTER — HOSPITAL ENCOUNTER (OUTPATIENT)
Dept: ONCOLOGY | Facility: HOSPITAL | Age: 65
Setting detail: RECURRING SERIES
Discharge: STILL ADMITTED OR ADMINISTRATIVE | End: 2020-05-15
Attending: INTERNAL MEDICINE

## 2020-02-25 ENCOUNTER — OFFICE VISIT CONVERTED (OUTPATIENT)
Dept: FAMILY MEDICINE CLINIC | Facility: CLINIC | Age: 65
End: 2020-02-25
Attending: FAMILY MEDICINE

## 2020-04-07 LAB
BASOPHILS # BLD AUTO: 0.03 10*3/UL (ref 0–0.2)
BASOPHILS NFR BLD AUTO: 0.3 % (ref 0–3)
CONV ABS IMM GRAN: 0.01 10*3/UL (ref 0–0.54)
CONV EOSINOPHILS PERCENT BY MANUAL COUNT: 1.9 % (ref 0–7)
CONV IMMATURE GRAN: 0.1 % (ref 0–0.4)
EOSINOPHIL # BLD MANUAL: 0.22 10*3/UL (ref 0–0.7)
ERYTHROCYTE [DISTWIDTH] IN BLOOD BY AUTOMATED COUNT: 18.3 % (ref 11.5–14.5)
ERYTHROCYTE [DISTWIDTH] IN BLOOD BY AUTOMATED COUNT: 52 FL
FERRITIN SERPL-MCNC: 1682 NG/ML (ref 10–200)
HBA1C MFR BLD: 10.9 G/DL (ref 12–16)
HCT VFR BLD AUTO: 34.2 % (ref 37–47)
IRON SATN MFR SERPL: 32 % (ref 20–55)
IRON SERPL-MCNC: 94 UG/DL (ref 60–170)
LYMPHOCYTES # BLD AUTO: 3.1 10*3/UL (ref 1–5)
LYMPHOCYTES NFR BLD AUTO: 26.8 % (ref 20–45)
MCH RBC QN AUTO: 24.9 PG (ref 27–31)
MCHC RBC AUTO-ENTMCNC: 31.9 G/DL (ref 33–37)
MCV RBC AUTO: 78.3 FL (ref 81–99)
MONOCYTES # BLD AUTO: 0.71 10*3/UL (ref 0.2–1.2)
MONOCYTES NFR BLD MANUAL: 6.1 % (ref 3–10)
NEUTROPHILS # BLD AUTO: 7.49 10*3/UL (ref 2–8)
NEUTROPHILS NFR BLD MANUAL: 64.8 % (ref 30–85)
PATHOLOGY REVIEW: NORMAL
PLATELET # BLD AUTO: 376 10*3/UL (ref 130–400)
PMV BLD AUTO: 9.8 FL (ref 7.4–10.4)
RBC MORPH BLD: 4.37 10*6/UL (ref 4.2–5.4)
TIBC SERPL-MCNC: 295 UG/DL (ref 245–450)
TRANSFERRIN SERPL-MCNC: 206 MG/DL (ref 250–380)
WBC # BLD AUTO: 11.56 10*3/UL (ref 4.8–10.8)

## 2020-04-09 ENCOUNTER — OFFICE VISIT CONVERTED (OUTPATIENT)
Dept: ONCOLOGY | Facility: HOSPITAL | Age: 65
End: 2020-04-09
Attending: INTERNAL MEDICINE

## 2020-05-06 ENCOUNTER — TELEPHONE CONVERTED (OUTPATIENT)
Dept: FAMILY MEDICINE CLINIC | Facility: CLINIC | Age: 65
End: 2020-05-06
Attending: FAMILY MEDICINE

## 2020-05-13 ENCOUNTER — HOSPITAL ENCOUNTER (OUTPATIENT)
Dept: PHYSICAL THERAPY | Facility: CLINIC | Age: 65
Setting detail: RECURRING SERIES
Discharge: HOME | End: 2020-06-26
Attending: FAMILY MEDICINE

## 2020-06-08 ENCOUNTER — HOSPITAL ENCOUNTER (OUTPATIENT)
Dept: ONCOLOGY | Facility: HOSPITAL | Age: 65
Discharge: HOME | End: 2020-06-08
Attending: INTERNAL MEDICINE

## 2020-06-08 LAB
BASOPHILS # BLD AUTO: 0.03 10*3/UL (ref 0–0.2)
BASOPHILS NFR BLD AUTO: 0.2 % (ref 0–3)
CONV ABS IMM GRAN: 0.04 10*3/UL (ref 0–0.54)
CONV EOSINOPHILS PERCENT BY MANUAL COUNT: 2.3 % (ref 0–7)
CONV IMMATURE GRAN: 0.3 % (ref 0–0.4)
EOSINOPHIL # BLD MANUAL: 0.31 10*3/UL (ref 0–0.7)
ERYTHROCYTE [DISTWIDTH] IN BLOOD BY AUTOMATED COUNT: 15.1 % (ref 11.5–14.5)
ERYTHROCYTE [DISTWIDTH] IN BLOOD BY AUTOMATED COUNT: 43.9 FL
ERYTHROCYTE [SEDIMENTATION RATE] IN BLOOD: 36 MM/H (ref 0–30)
FERRITIN SERPL-MCNC: 1178 NG/ML (ref 10–200)
HBA1C MFR BLD: 11 G/DL (ref 12–16)
HCT VFR BLD AUTO: 35.3 % (ref 37–47)
IRON SERPL-MCNC: 56 UG/DL (ref 60–170)
LYMPHOCYTES # BLD AUTO: 2.94 10*3/UL (ref 1–5)
LYMPHOCYTES NFR BLD AUTO: 22.1 % (ref 20–45)
MCH RBC QN AUTO: 24.8 PG (ref 27–31)
MCHC RBC AUTO-ENTMCNC: 31.2 G/DL (ref 33–37)
MCV RBC AUTO: 79.5 FL (ref 81–99)
MONOCYTES # BLD AUTO: 1.15 10*3/UL (ref 0.2–1.2)
MONOCYTES NFR BLD MANUAL: 8.6 % (ref 3–10)
NEUTROPHILS # BLD AUTO: 8.84 10*3/UL (ref 2–8)
NEUTROPHILS NFR BLD MANUAL: 66.5 % (ref 30–85)
PLATELET # BLD AUTO: 389 10*3/UL (ref 130–400)
PMV BLD AUTO: 10.3 FL (ref 7.4–10.4)
RBC MORPH BLD: 4.44 10*6/UL (ref 4.2–5.4)
WBC # BLD AUTO: 13.31 10*3/UL (ref 4.8–10.8)

## 2020-06-09 ENCOUNTER — OFFICE VISIT CONVERTED (OUTPATIENT)
Dept: ONCOLOGY | Facility: HOSPITAL | Age: 65
End: 2020-06-09
Attending: INTERNAL MEDICINE

## 2020-06-10 LAB — CONV LEUKEMIA/LYMPHOMA PHENOTYPING PROFILE (BLOOD): NORMAL

## 2020-06-29 ENCOUNTER — HOSPITAL ENCOUNTER (OUTPATIENT)
Dept: FAMILY MEDICINE CLINIC | Facility: CLINIC | Age: 65
Discharge: HOME | End: 2020-06-29
Attending: FAMILY MEDICINE

## 2020-07-09 ENCOUNTER — OFFICE VISIT CONVERTED (OUTPATIENT)
Dept: FAMILY MEDICINE CLINIC | Facility: CLINIC | Age: 65
End: 2020-07-09
Attending: FAMILY MEDICINE

## 2020-07-09 ENCOUNTER — HOSPITAL ENCOUNTER (OUTPATIENT)
Dept: GENERAL RADIOLOGY | Facility: HOSPITAL | Age: 65
Discharge: HOME | End: 2020-07-09
Attending: FAMILY MEDICINE

## 2020-08-06 ENCOUNTER — HOSPITAL ENCOUNTER (OUTPATIENT)
Dept: FAMILY MEDICINE CLINIC | Facility: CLINIC | Age: 65
Discharge: HOME | End: 2020-08-06
Attending: FAMILY MEDICINE

## 2020-08-06 ENCOUNTER — OFFICE VISIT CONVERTED (OUTPATIENT)
Dept: FAMILY MEDICINE CLINIC | Facility: CLINIC | Age: 65
End: 2020-08-06
Attending: FAMILY MEDICINE

## 2020-08-07 LAB — SARS-COV-2 RNA SPEC QL NAA+PROBE: NOT DETECTED

## 2020-09-15 ENCOUNTER — HOSPITAL ENCOUNTER (OUTPATIENT)
Dept: OTHER | Facility: HOSPITAL | Age: 65
Discharge: HOME | End: 2020-09-15
Attending: INTERNAL MEDICINE

## 2020-09-15 LAB
ALBUMIN SERPL-MCNC: 4.2 G/DL (ref 3.5–5)
ALBUMIN/GLOB SERPL: 1.2 {RATIO} (ref 1.4–2.6)
ALP SERPL-CCNC: 91 U/L (ref 43–160)
ALT SERPL-CCNC: 11 U/L (ref 10–40)
ANION GAP SERPL CALC-SCNC: 16 MMOL/L (ref 8–19)
AST SERPL-CCNC: 16 U/L (ref 15–50)
BASOPHILS # BLD AUTO: 0.04 10*3/UL (ref 0–0.2)
BASOPHILS NFR BLD AUTO: 0.3 % (ref 0–3)
BILIRUB SERPL-MCNC: 0.26 MG/DL (ref 0.2–1.3)
BUN SERPL-MCNC: 12 MG/DL (ref 5–25)
BUN/CREAT SERPL: 16 {RATIO} (ref 6–20)
CALCIUM SERPL-MCNC: 9.6 MG/DL (ref 8.7–10.4)
CHLORIDE SERPL-SCNC: 100 MMOL/L (ref 99–111)
CONV ABS IMM GRAN: 0.07 10*3/UL (ref 0–0.2)
CONV CO2: 27 MMOL/L (ref 22–32)
CONV IMMATURE GRAN: 0.5 % (ref 0–1.8)
CONV TOTAL PROTEIN: 7.8 G/DL (ref 6.3–8.2)
CREAT UR-MCNC: 0.73 MG/DL (ref 0.5–0.9)
DEPRECATED RDW RBC AUTO: 41.8 FL (ref 36.4–46.3)
EOSINOPHIL # BLD AUTO: 0.38 10*3/UL (ref 0–0.7)
EOSINOPHIL # BLD AUTO: 2.7 % (ref 0–7)
ERYTHROCYTE [DISTWIDTH] IN BLOOD BY AUTOMATED COUNT: 15.5 % (ref 11.7–14.4)
ERYTHROCYTE [SEDIMENTATION RATE] IN BLOOD: 48 MM/H (ref 0–30)
GFR SERPLBLD BASED ON 1.73 SQ M-ARVRAT: >60 ML/MIN/{1.73_M2}
GLOBULIN UR ELPH-MCNC: 3.6 G/DL (ref 2–3.5)
GLUCOSE SERPL-MCNC: 98 MG/DL (ref 65–99)
HCT VFR BLD AUTO: 30.5 % (ref 37–47)
HGB BLD-MCNC: 9.3 G/DL (ref 12–16)
LYMPHOCYTES # BLD AUTO: 2.84 10*3/UL (ref 1–5)
LYMPHOCYTES NFR BLD AUTO: 20.5 % (ref 20–45)
MCH RBC QN AUTO: 23.2 PG (ref 27–31)
MCHC RBC AUTO-ENTMCNC: 30.5 G/DL (ref 33–37)
MCV RBC AUTO: 76.1 FL (ref 81–99)
MONOCYTES # BLD AUTO: 1.22 10*3/UL (ref 0.2–1.2)
MONOCYTES NFR BLD AUTO: 8.8 % (ref 3–10)
NEUTROPHILS # BLD AUTO: 9.3 10*3/UL (ref 2–8)
NEUTROPHILS NFR BLD AUTO: 67.2 % (ref 30–85)
NRBC CBCN: 0 % (ref 0–0.7)
OSMOLALITY SERPL CALC.SUM OF ELEC: 290 MOSM/KG (ref 273–304)
PLATELET # BLD AUTO: 412 10*3/UL (ref 130–400)
PMV BLD AUTO: 10.6 FL (ref 9.4–12.3)
POTASSIUM SERPL-SCNC: 3.4 MMOL/L (ref 3.5–5.3)
RBC # BLD AUTO: 4.01 10*6/UL (ref 4.2–5.4)
SODIUM SERPL-SCNC: 140 MMOL/L (ref 135–147)
WBC # BLD AUTO: 13.85 10*3/UL (ref 4.8–10.8)

## 2020-09-18 ENCOUNTER — OFFICE VISIT CONVERTED (OUTPATIENT)
Dept: ONCOLOGY | Facility: HOSPITAL | Age: 65
End: 2020-09-18
Attending: INTERNAL MEDICINE

## 2020-09-29 ENCOUNTER — OFFICE VISIT CONVERTED (OUTPATIENT)
Dept: FAMILY MEDICINE CLINIC | Facility: CLINIC | Age: 65
End: 2020-09-29
Attending: FAMILY MEDICINE

## 2020-09-29 ENCOUNTER — CONVERSION ENCOUNTER (OUTPATIENT)
Dept: FAMILY MEDICINE CLINIC | Facility: CLINIC | Age: 65
End: 2020-09-29

## 2020-10-05 ENCOUNTER — HOSPITAL ENCOUNTER (OUTPATIENT)
Dept: ONCOLOGY | Facility: HOSPITAL | Age: 65
Discharge: HOME | End: 2020-10-05
Attending: INTERNAL MEDICINE

## 2020-10-06 ENCOUNTER — HOSPITAL ENCOUNTER (OUTPATIENT)
Dept: ONCOLOGY | Facility: HOSPITAL | Age: 65
Discharge: HOME | End: 2020-10-06
Attending: INTERNAL MEDICINE

## 2020-10-06 ENCOUNTER — HOSPITAL ENCOUNTER (OUTPATIENT)
Dept: ONCOLOGY | Facility: HOSPITAL | Age: 65
Discharge: HOME | End: 2020-10-06
Attending: FAMILY MEDICINE

## 2020-10-06 LAB
ALBUMIN SERPL-MCNC: 4.3 G/DL (ref 3.5–5)
ALBUMIN/GLOB SERPL: 1.3 {RATIO} (ref 1.4–2.6)
ALP SERPL-CCNC: 86 U/L (ref 43–160)
ALT SERPL-CCNC: 12 U/L (ref 10–40)
ANION GAP SERPL CALC-SCNC: 14 MMOL/L (ref 8–19)
AST SERPL-CCNC: 15 U/L (ref 15–50)
BASOPHILS # BLD AUTO: 0.03 10*3/UL (ref 0–0.2)
BASOPHILS NFR BLD AUTO: 0.2 % (ref 0–3)
BILIRUB SERPL-MCNC: 0.3 MG/DL (ref 0.2–1.3)
BUN SERPL-MCNC: 8 MG/DL (ref 5–25)
BUN/CREAT SERPL: 12 {RATIO} (ref 6–20)
CALCIUM SERPL-MCNC: 9.8 MG/DL (ref 8.7–10.4)
CHLORIDE SERPL-SCNC: 100 MMOL/L (ref 99–111)
CONV ABS IMM GRAN: 0.03 10*3/UL (ref 0–0.54)
CONV CO2: 27 MMOL/L (ref 22–32)
CONV EOSINOPHILS PERCENT BY MANUAL COUNT: 2.5 % (ref 0–7)
CONV IMMATURE GRAN: 0.2 % (ref 0–0.4)
CONV RHEUMATOID FACTOR IGM: <10 [IU]/ML (ref 0–14)
CONV TOTAL PROTEIN: 7.7 G/DL (ref 6.3–8.2)
CREAT UR-MCNC: 0.66 MG/DL (ref 0.5–0.9)
EOSINOPHIL # BLD MANUAL: 0.3 10*3/UL (ref 0–0.7)
ERYTHROCYTE [DISTWIDTH] IN BLOOD BY AUTOMATED COUNT: 16.4 % (ref 11.5–14.5)
ERYTHROCYTE [DISTWIDTH] IN BLOOD BY AUTOMATED COUNT: 44.7 FL
FERRITIN SERPL-MCNC: 1119 NG/ML (ref 10–200)
GFR SERPLBLD BASED ON 1.73 SQ M-ARVRAT: >60 ML/MIN/{1.73_M2}
GLOBULIN UR ELPH-MCNC: 3.4 G/DL (ref 2–3.5)
GLUCOSE SERPL-MCNC: 96 MG/DL (ref 65–99)
HBA1C MFR BLD: 9.4 G/DL (ref 12–16)
HCT VFR BLD AUTO: 30.9 % (ref 37–47)
IRON SATN MFR SERPL: 18 % (ref 20–55)
IRON SERPL-MCNC: 44 UG/DL (ref 60–170)
LDH SERPL-CCNC: 155 U/L (ref 120–240)
LYMPHOCYTES # BLD AUTO: 2.44 10*3/UL (ref 1–5)
LYMPHOCYTES NFR BLD AUTO: 20 % (ref 20–45)
MCH RBC QN AUTO: 22.8 PG (ref 27–31)
MCHC RBC AUTO-ENTMCNC: 30.4 G/DL (ref 33–37)
MCV RBC AUTO: 75 FL (ref 81–99)
MONOCYTES # BLD AUTO: 0.85 10*3/UL (ref 0.2–1.2)
MONOCYTES NFR BLD MANUAL: 7 % (ref 3–10)
NEUTROPHILS # BLD AUTO: 8.56 10*3/UL (ref 2–8)
NEUTROPHILS NFR BLD MANUAL: 70.1 % (ref 30–85)
OSMOLALITY SERPL CALC.SUM OF ELEC: 284 MOSM/KG (ref 273–304)
PATHOLOGY REVIEW: NORMAL
PLATELET # BLD AUTO: 459 10*3/UL (ref 130–400)
PMV BLD AUTO: 10 FL (ref 7.4–10.4)
POTASSIUM SERPL-SCNC: 3.1 MMOL/L (ref 3.5–5.3)
RBC MORPH BLD: 4.12 10*6/UL (ref 4.2–5.4)
SODIUM SERPL-SCNC: 138 MMOL/L (ref 135–147)
T4 FREE SERPL-MCNC: 1.1 NG/DL (ref 0.9–1.8)
TIBC SERPL-MCNC: 249 UG/DL (ref 245–450)
TRANSFERRIN SERPL-MCNC: 174 MG/DL (ref 250–380)
TSH SERPL-ACNC: 1.87 M[IU]/L (ref 0.27–4.2)
WBC # BLD AUTO: 12.21 10*3/UL (ref 4.8–10.8)

## 2020-10-07 LAB
ALBUMIN SERPL-MCNC: 3.9 G/DL (ref 2.9–4.4)
ALBUMIN/GLOB SERPL: 1 {RATIO} (ref 0.7–1.7)
ALPHA2 GLOB SERPL ELPH-MCNC: 0.9 G/DL (ref 0.4–1)
BETA GLOBULIN: 1.3 G/DL (ref 0.7–1.3)
CONV ALPHA-1-GLOBULIN: 0.3 G/DL (ref 0–0.4)
CONV HGB ELECTROPHORESIS INTERPRETATION: NORMAL
CONV IMMUNOGLOBULIN G (IGG): 1486 MG/DL (ref 586–1602)
CONV IMMUNOGLOBULIN M (IGM): 61 MG/DL (ref 26–217)
CONV PE INTERPRETATION: ABNORMAL
CONV PE NOTE: ABNORMAL
CONV TOTAL PROTEIN: 7.7 G/DL (ref 6–8.5)
FREE LIGHT CHAINS: 33 MG/L (ref 3.3–19.4)
GAMMA GLOB SERPL ELPH-MCNC: 1.3 G/DL (ref 0.4–1.8)
GLOBULIN UR ELPH-MCNC: 3.8 G/DL (ref 2.2–3.9)
HAPTOGLOB SERPL-MCNC: 155 MG/DL (ref 37–355)
HEMOGLOBIN A: 98 % (ref 96.4–98.8)
HGB A2 MFR BLD COLUMN CHROM: 2 % (ref 1.8–3.2)
HGB C MFR BLD: 0 %
HGB F MFR BLD HPLC: 0 % (ref 0–2)
HGB S BLD QL SOLY: NEGATIVE
HGB S MFR BLD: 0 %
IGA SERPL-MCNC: 366 MG/DL (ref 87–352)
KAPPA LC/LAMBDA SER: 2.23 {RATIO} (ref 0.26–1.65)
LAMBDA LC FREE SERPL-MCNC: 14.8 MG/L (ref 5.7–26.3)
M-SPIKE: ABNORMAL G/DL
PROT PATTERN SERPL IFE-IMP: ABNORMAL

## 2020-10-08 ENCOUNTER — HOSPITAL ENCOUNTER (OUTPATIENT)
Dept: GENERAL RADIOLOGY | Facility: HOSPITAL | Age: 65
Discharge: HOME | End: 2020-10-08
Attending: FAMILY MEDICINE

## 2020-10-08 LAB
DSDNA AB SER-ACNC: NEGATIVE [IU]/ML
ENA AB SER IA-ACNC: ABNORMAL {RATIO}

## 2020-10-09 LAB
ALBUMIN 24H MFR UR ELPH: 45.3 %
ALPHA1 GLOB 24H MFR UR ELPH: 10.2 %
ALPHA2 GLOB 24H MFR UR ELPH: 13.4 %
B-GLOBULIN MFR UR ELPH: 19.3 %
CONV IMMUNOFIXATION (URINE): NORMAL
CONV PE NOTE: NORMAL
DSDNA AB, IGG: NORMAL
GAMMA GLOB 24H MFR UR ELPH: 11.8 %
M PROTEIN MFR UR ELPH: NORMAL %
PROT UR-MCNC: 10.3 MG/DL

## 2020-10-11 LAB
CENTROMERE AB TITR SER IF: <0.4 [IU]/ML (ref 0–7)
ENA SCL70 AB SER QL IA: <0.6 [IU]/ML (ref 0–7)
JO-1 (WB)*: 0.4 [IU]/ML (ref 0–7)
RNP: 1.7 [IU]/ML (ref 0–5)
RNP: <0.3 [IU]/ML (ref 0–7)
SMI: 1 [IU]/ML (ref 0–7)
SSA (RO) (ENA) AB, IGG: 5.2 [IU]/ML (ref 0–7)
SSB (LA) ENA AB, IGG: 0.5 [IU]/ML (ref 0–7)

## 2020-10-12 ENCOUNTER — OFFICE VISIT CONVERTED (OUTPATIENT)
Dept: FAMILY MEDICINE CLINIC | Facility: CLINIC | Age: 65
End: 2020-10-12
Attending: FAMILY MEDICINE

## 2020-10-12 LAB — JAK2 GENE QUAL: NORMAL

## 2020-10-15 LAB — JAK2 EXONS 12-15 MUT DET PCR: NORMAL

## 2020-10-27 ENCOUNTER — OFFICE VISIT CONVERTED (OUTPATIENT)
Dept: FAMILY MEDICINE CLINIC | Facility: CLINIC | Age: 65
End: 2020-10-27
Attending: FAMILY MEDICINE

## 2020-11-20 ENCOUNTER — OFFICE VISIT CONVERTED (OUTPATIENT)
Dept: ONCOLOGY | Facility: HOSPITAL | Age: 65
End: 2020-11-20
Attending: INTERNAL MEDICINE

## 2020-12-28 ENCOUNTER — TELEPHONE CONVERTED (OUTPATIENT)
Dept: FAMILY MEDICINE CLINIC | Facility: CLINIC | Age: 65
End: 2020-12-28
Attending: FAMILY MEDICINE

## 2020-12-30 ENCOUNTER — HOSPITAL ENCOUNTER (OUTPATIENT)
Dept: FAMILY MEDICINE CLINIC | Facility: CLINIC | Age: 65
Discharge: HOME | End: 2020-12-30
Attending: FAMILY MEDICINE

## 2020-12-30 LAB
ANION GAP SERPL CALC-SCNC: 15 MMOL/L (ref 8–19)
BUN SERPL-MCNC: 14 MG/DL (ref 5–25)
BUN/CREAT SERPL: 17 {RATIO} (ref 6–20)
CALCIUM SERPL-MCNC: 10.1 MG/DL (ref 8.7–10.4)
CHLORIDE SERPL-SCNC: 101 MMOL/L (ref 99–111)
CONV CO2: 28 MMOL/L (ref 22–32)
CREAT UR-MCNC: 0.84 MG/DL (ref 0.5–0.9)
GFR SERPLBLD BASED ON 1.73 SQ M-ARVRAT: >60 ML/MIN/{1.73_M2}
GLUCOSE SERPL-MCNC: 95 MG/DL (ref 65–99)
OSMOLALITY SERPL CALC.SUM OF ELEC: 290 MOSM/KG (ref 273–304)
POTASSIUM SERPL-SCNC: 3.6 MMOL/L (ref 3.5–5.3)
SODIUM SERPL-SCNC: 140 MMOL/L (ref 135–147)

## 2021-02-08 ENCOUNTER — OFFICE VISIT CONVERTED (OUTPATIENT)
Dept: FAMILY MEDICINE CLINIC | Facility: CLINIC | Age: 66
End: 2021-02-08
Attending: FAMILY MEDICINE

## 2021-02-08 ENCOUNTER — HOSPITAL ENCOUNTER (OUTPATIENT)
Dept: VACCINE CLINIC | Facility: HOSPITAL | Age: 66
Discharge: HOME | End: 2021-02-08
Attending: INTERNAL MEDICINE

## 2021-02-17 ENCOUNTER — OFFICE VISIT CONVERTED (OUTPATIENT)
Dept: FAMILY MEDICINE CLINIC | Facility: CLINIC | Age: 66
End: 2021-02-17
Attending: FAMILY MEDICINE

## 2021-02-17 ENCOUNTER — HOSPITAL ENCOUNTER (OUTPATIENT)
Dept: FAMILY MEDICINE CLINIC | Facility: CLINIC | Age: 66
Discharge: HOME | End: 2021-02-17
Attending: FAMILY MEDICINE

## 2021-03-11 ENCOUNTER — HOSPITAL ENCOUNTER (OUTPATIENT)
Dept: VACCINE CLINIC | Facility: HOSPITAL | Age: 66
Discharge: HOME | End: 2021-03-11
Attending: INTERNAL MEDICINE

## 2021-03-31 ENCOUNTER — CONVERSION ENCOUNTER (OUTPATIENT)
Dept: FAMILY MEDICINE CLINIC | Facility: CLINIC | Age: 66
End: 2021-03-31

## 2021-03-31 ENCOUNTER — HOSPITAL ENCOUNTER (OUTPATIENT)
Dept: FAMILY MEDICINE CLINIC | Facility: CLINIC | Age: 66
Discharge: HOME | End: 2021-03-31
Attending: FAMILY MEDICINE

## 2021-03-31 ENCOUNTER — OFFICE VISIT CONVERTED (OUTPATIENT)
Dept: FAMILY MEDICINE CLINIC | Facility: CLINIC | Age: 66
End: 2021-03-31
Attending: FAMILY MEDICINE

## 2021-03-31 LAB
BILIRUB UR QL STRIP: NEGATIVE
COLOR UR: YELLOW
CONV CLARITY OF URINE: CLEAR
CONV PROTEIN IN URINE BY AUTOMATED TEST STRIP: NEGATIVE
CONV UROBILINOGEN IN URINE BY AUTOMATED TEST STRIP: NORMAL
GLUCOSE UR QL: NEGATIVE
HGB UR QL STRIP: NORMAL
KETONES UR QL STRIP: NEGATIVE
LEUKOCYTE ESTERASE UR QL STRIP: NEGATIVE
NITRITE UR QL STRIP: NEGATIVE
PH UR STRIP.AUTO: 7.5 [PH]
SP GR UR: 1.02

## 2021-04-02 LAB — BACTERIA UR CULT: NORMAL

## 2021-04-12 ENCOUNTER — OFFICE VISIT CONVERTED (OUTPATIENT)
Dept: FAMILY MEDICINE CLINIC | Facility: CLINIC | Age: 66
End: 2021-04-12
Attending: FAMILY MEDICINE

## 2021-05-13 NOTE — PROGRESS NOTES
Progress Note      Patient Name: Josephine Díaz   Patient ID: [id number]   Sex: Female   YOB: 1955    Primary Care Provider: Sabas Boyce DO   Referring Provider: Sabas Boyce DO    Visit Date: October 12, 2020    Provider: Sabas Boyce DO   Location: [facility]   Location Address: [address] [place] [address], [address] [place] [address]  [id number]   Location Phone: [phone]          Chief Complaint  · check up      History Of Present Illness  Josephine Díaz is a 65 year old /Black female who presents for evaluation and treatment of:      Patient presents today for checkup.  She started taking potassium but states that she is supposed to be dissolving the potassium.  I sent in the potassium oral tablets.  I encouraged her to give us a call to let us know which potassium she has.  Her potassium level was checked recently was noted to be low.  She did have labs recently done by her hematologist/oncologist, Dr. Benjamin.  This showed that her potassium was 3.1.  She has not been regularly taking it.  I encouraged her to do so.  She does take Maxide which can contribute to hypokalemia.  I previously checked her aldosterone levels which were normal.  Her renin activity was intact.  Blood pressure is well controlled.  I reviewed her thyroid profile which was normal as well.  She does have leukocytosis which per hematologist is felt to possibly be due to arthritis.  She has anemia as well which is being evaluated.  They may consider a bone marrow biopsy in the future.  She will have her right knee replaced on November 6.  Preop labs and EKG are planned to be done tomorrow.  She is requesting renewal of referrals due to insurance change.  She is seeing pulmonology as well for stable asthma.  She does need a mammogram as well.       Past Medical History  Allergic rhinitis; Anemia; Arthritis; Asthma; Essential hypertension; Family history of diabetes mellitus; Family  history of hypertension; Family History Of Ischemic Heart Disease; Gall stones; Hyperlipidemia; Hypertension, essential; Hypertriglyceridemia; Joint Pain, Multiple Sites; Low back pain; Morbid obesity; Myocardial infarction; Screening Mammogram; Shortness of Breath; Sinus trouble; Sleep apnea, unspecified; Vitamin D deficiency         Past Surgical History  Appendectomy;  section; Cholecystectomy; Colonoscopy; Hysterectomy; Knee replacement, left         Medication List  Advair Diskus 250-50 mcg/dose inhalation blister with device; albuterol sulfate 2.5 mg /3 mL (0.083 %) inhalation solution for nebulization; Allegra Allergy 180 mg oral tablet; azithromycin 250 mg oral tablet; Calcium 500 + D 500 mg(1,250mg) -200 unit oral tablet; clonidine HCl 0.1 mg oral tablet; cyclobenzaprine 10 mg oral tablet; docusate sodium 100 mg oral tablet; Eliquis 2.5 mg oral tablet; ferrous sulfate 325 mg (65 mg iron) oral tablet; Flonase Allergy Relief 50 mcg/actuation nasal spray,suspension; hydrocodone-acetaminophen 7.5-325 mg oral tablet; ipratropium bromide 0.03 % nasal spray,non-aerosol; ketoconazole 2 % topical cream; Maxzide 75-50 mg oral tablet; montelukast 10 mg oral tablet; nitroglycerin 0.4 mg sublingual tablet, sublingual; Norvasc 5 mg oral tablet; nystatin 100,000 unit/mL oral suspension; omeprazole 20 mg oral capsule,delayed release(DR/EC); ondansetron 4 mg oral tablet,disintegrating; potassium chloride 10 mEq oral tablet extended release; Pulmicort 0.5 mg/2 mL inhalation suspension for nebulization; rosuvastatin 20 mg oral tablet; tolterodine 2 mg oral capsule,extended release 24hr; Voltaren 1 % topical gel         Allergy List  Benadryl; PENICILLINS; Tomato       Allergies Reconciled  Family Medical History  Hypertension; Heart Attack (MI); Diabetes Mellitus, Type II; No family history of colorectal cancer         Reproductive History   0 Para 0 0 0 0       Social History  Alcohol (Never); [facility] Goal  Statement:; Tobacco (Former)         Immunizations  Name Date Admin   Influenza Refused   Influenza Refused   Prevnar 13 02/25/2020   Shingles 02/06/2019         Review of Systems     General: Denies any fever, chills, weight changes, or night sweats  HEENT:  Denies any vision or hearing changes. Denies any neck tenderness. Denies any headaches. Denies nasal congestion  Cardiovascular: Denies any chest pain or palpitations  respiratory: Denies any cough or wheezing. Denies any shortness of breath  Gastrointestinal: Denies any nausea vomiting or diarrhea, Denies constipation  Extremities: Denies any edema  Psychiatric: Denies any changes in mood or affect  Neurologic: Denies any neurologic deficits  skin: Denies any rashes or lesions.  endocrine: Denies any weight loss, fever, night sweats  Musculoskeletal: Right knee pain       Vitals  Date Time BP Position Site L\R Cuff Size HR RR TEMP (F) WT  HT  BMI kg/m2 BSA m2 O2 Sat FR L/min FiO2 HC       10/12/2020 11:26 /76 Sitting    93 - R  97.3 216lbs 6oz 5'   42.26 2.04 98 %            Physical Examination     General: AAO 3, no acute distress, pleasant  HEENT: Normocephalic, atraumatic, no discharge in the eyes, no discharge from the nose, no oropharyngeal erythema or exudates, and TMs intact bilaterally with no erythema, no cervical tenderness or lymphadenopathy  Cardiovascular: Regular rate and rhythm without appreciable murmur  Respiratory: Clear to auscultation bilaterally no RRW  Gastrointestinal: Soft nontender nondistended with bowel sounds present  extremities: No clubbing, cyanosis or edema  Neurologic: CN II through XII grossly intact   Psychiatric: Normal mood and affect           Assessment  · Anemia     285.9/D64.9  · Asthma     493.90/J45.909  · Need for influenza vaccination     V04.81/Z23  · Visit for screening mammogram     V76.12/Z12.31  · Bilateral knee pain       Pain in right knee     719.46/M25.561  Pain in left  knee     719.46/M25.562  · Leukocytosis     288.60/D72.829  · Hypokalemia     276.8/E87.6      Plan  · Orders  o ACO-14: Influenza immunization was not administered for reasons documented () - V04.81/Z23 - 10/12/2020   declines  o Screening Mammography; Bilateral 3D (45295, 60411, ) - V76.12/Z12.31 - 10/12/2020   [place] one [place]  o ACO-39: Current medications updated and reviewed (1159F, ) - - 10/12/2020  o ACO-19: Colorectal cancer screening results documented and reviewed (3017F) - - 10/12/2020  o ORTHOPEDIC CONSULTATION (ORTHO) - 719.46/M25.561, 719.46/M25.562 - 10/12/2020   needs referral for Nicole in [place]. Already has appointment  o Hematology / Oncology Consult (HEMOC) - 288.60/D72.829, 285.9/D64.9 - 10/12/2020   Needs referral to see Dr. Benjamin. She will make her own appointment. already established with DR. Benjamin at [facility]  o PULMONARY CONSULTATION (PULMO) - 493.90/J45.909 - 10/12/2020   Needs referall to see Dr. Luisa Arteaga in [place]. Patient will make own appointment. She is with [facility]  · Instructions  o Patient was educated/instructed on their diagnosis, treatment and medications prior to discharge from the clinic today.  o Patient instructed to seek medical attention urgently for new or worsening symptoms.  o Call the office with any concerns or questions.  o Discussed potassium supplement with patient as per above. She will call and let us know which potassium she is taking. I encouraged her to take the 10 mEq once daily. She will have her potassium repeated with labs tomorrow. I encouraged her to bring a copy of these results for my review. I will see her back in 2 weeks for preop assessment.  · Disposition  o Follow Up in 2 weeks.            Electronically Signed by: Sabas Boyce DO -Author on October 12, 2020 12:22:17 PM

## 2021-05-13 NOTE — PROGRESS NOTES
Quick Note      Patient Name: Josephine Díaz   Patient ID: [id number]   Sex: Female   YOB: 1955    Primary Care Provider: Sabas Boyce DO   Referring Provider: Sabas Boyce DO    Visit Date: May 6, 2020    Provider: Sabas Boyce DO   Location: [facility]   Location Address: [address] [place] [address], [address]  [id number]   Location Phone: [phone]          History Of Present Illness  TELEHEALTH TELEPHONE VISIT  Chief Complaint: low back pain   Josephine Díaz is a 64 year old /Black female who is presenting for evaluation via telehealth telephone visit. Verbal consent obtained before beginning visit.   Provider spent 22 minutes with the patient during telehealth visit.   The following staff were present during this visit: provider only   Past Medical History/Overview of Patient Symptoms     Patient presents today for a telehealth visit.  This is done by telephone.  She is unaccompanied.  She gives me her blood pressure result for today which is 114/80 with a pulse of 83.  She was unable to check her temperature at home.  She reports her main issue is still low back pain with bilateral sciatica and radiation of pain down her legs.  She did have a lumbar spine MRI done back in October 2019 which showed multilevel degenerative changes including broad-based bulging of the annulus at L3-L4.  This does cause moderate to severe narrowing of the left intervertebral foramina as well as moderate narrowing of the right intervertebral foramina.  There is central canal stenosis as well.  She has broad-based bulging at L4-L5.  Patient reports that physical therapy has worked well.  Flexeril has not been that effective and lidocaine patches seem to make her symptoms worse.  She does have radiation of pain down both legs were initially it was just the left side.  She has been seen by hematology/oncology and received for anemia.  I will request these records.  She had  labs done as well.  I will request this.  She is seeing orthopedics as well.  She may ultimately get knee replacements however she may have a metal allergy so she is being checked by allergy/immunology for this.  I encouraged patient get labs done at her earliest convenience as we did order labs including a pneumococcal titer as patient was given a Prevnar 13 as she previously received pneumococcal 23 back in August 2017.           Assessment  · Osteoarthritis     715.90/M19.90  · Chronic bilateral low back pain with bilateral sciatica       Lumbago with sciatica, left side     724.2/M54.42  Lumbago with sciatica, right side     724.2/M54.41  Other chronic pain     724.2/G89.29  · Iron deficiency anemia     280.9/D50.9  · Recurrent infections     136.9/B99.9  · Hypertension     401.9/I10      Plan  · Orders  o Physician Telephone Evaluation, 21-30 minutes (90598) - - 05/06/2020  o PHYSICAL THERAPY CONSULTATION ([facility]) - 724.2/M54.42, 724.2/M54.41, 724.2/G89.29 - 05/06/2020   chronic low back pain. Requesting [facility] PT. Pool therapy  · Instructions  o Plan Of Care:   o Take all medications as prescribed/directed.  o Discussed with patient doing physical therapy as she did have a positive benefit from this. Now that some treatments are opening up again discussed putting another physical therapy referral. I spent 22 minutes on medical discussion with patient. Plan on seeing her back in 2 months for checkup. Patient to call with any questions or concerns.  o As far as pain management is concerned she is not interested in having injections done at this time. Gabapentin has been ineffective. She is not interested in adding medication at this time. She does not want to be referred to neurosurgery.  · Disposition  o Follow Up in 2 months.            Electronically Signed by: Sabas Boyce DO - on May 6, 2020 10:19:19 AM

## 2021-05-13 NOTE — PROGRESS NOTES
Progress Note      Patient Name: Josephine Díaz   Patient ID: [id number]   Sex: Female   YOB: 1955    Primary Care Provider: Sabas Boyce DO   Referring Provider: Sabas Boyce DO    Visit Date: 2020    Provider: Sabas Boyce DO   Location: [facility]   Location Address: [address] [place] [address], [address]  [id number]   Location Phone: [phone]          Chief Complaint  · acute      History Of Present Illness  Josephine Díaz is a 65 year old /Black female who presents for evaluation and treatment of:      Patient presents for an acute visit.  She had left knee replacement done on 2020.  This was done by Dr. Bethel Miller.  She left the hospital on 2020.  Her knee has been overall doing well.  She reports having couple fevers including yesterday of 101.  She denies any shortness of breath.  Denies any issues with asthma.  She does have a slight cough and feels some congestion in her chest.  She also reports that her mouth feels really dry she does have pain when she swallows.  She denies any sick contacts or any contact with someone with coronavirus although she was in the hospital recently.  She reports otherwise doing well.       Past Medical History  Allergic rhinitis; Anemia; Arthritis; Asthma; Essential hypertension; Family history of diabetes mellitus; Family history of hypertension; Family History Of Ischemic Heart Disease; Gall stones; Hyperlipidemia; Hypertension, essential; Hypertriglyceridemia; Joint Pain, Multiple Sites; Low back pain; Morbid obesity; Myocardial infarction; Screening Mammogram; Shortness of Breath; Sinus trouble; Sleep apnea, unspecified; Vitamin D deficiency         Past Surgical History  Appendectomy;  section; Cholecystectomy; Colonoscopy; Hysterectomy; Knee replacement, left         Medication List  Advair Diskus 250-50 mcg/dose inhalation blister with device; albuterol sulfate 2.5 mg /3 mL  (0.083 %) inhalation solution for nebulization; Allegra Allergy 180 mg oral tablet; Calcium 500 + D 500 mg(1,250mg) -200 unit oral tablet; clonidine HCl 0.1 mg oral tablet; cyclobenzaprine 10 mg oral tablet; docusate sodium 100 mg oral tablet; Eliquis 2.5 mg oral tablet; ferrous sulfate 325 mg (65 mg iron) oral tablet; Flonase Allergy Relief 50 mcg/actuation nasal spray,suspension; hydrocodone-acetaminophen 7.5-325 mg oral tablet; ketoconazole 2 % topical cream; Maxzide 75-50 mg oral tablet; montelukast 10 mg oral tablet; Norvasc 5 mg oral tablet; omeprazole 20 mg oral capsule,delayed release(DR/EC); ondansetron 4 mg oral tablet,disintegrating; potassium chloride 10 mEq oral tablet extended release; Pulmicort 0.5 mg/2 mL inhalation suspension for nebulization; rosuvastatin 20 mg oral tablet; tolterodine 2 mg oral tablet; Voltaren 1 % topical gel         Allergy List  Benadryl; PENICILLINS; Tomato         Family Medical History  Hypertension; Heart Attack (MI); Diabetes Mellitus, Type II; No family history of colorectal cancer         Reproductive History   0 Para 0 0 0 0       Social History  Alcohol (Never); [facility] Goal Statement:; Tobacco (Former)         Immunizations  Name Date Admin   Influenza Refused   Prevnar 13    Shingles          Review of Systems     General: as discussed above  HEENT:  Denies any vision or hearing changes. Denies any neck tenderness. Denies any headaches. Denies nasal congestion.  Otherwise as discussed above  Cardiovascular: Denies any chest pain or palpitations  respiratory: As discussed above.  Reports some phlegm production reports initially she had some black discoloration while still in the hospital but this has improved  Gastrointestinal: Denies any nausea vomiting or diarrhea, Denies constipation  Extremities: Denies any edema  Psychiatric: Denies any changes in mood or affect  Neurologic: Denies any neurologic deficits  skin: No concerns about surgical  incision  Musculoskeletal: Denies any weakness       Vitals  Date Time BP Position Site L\R Cuff Size HR RR TEMP (F) WT  HT  BMI kg/m2 BSA m2 O2 Sat HC       08/06/2020 10:17 /68 Sitting    113 - R  96.6 213lbs 4oz 5'   41.65 2.02 98 %          Physical Examination     General: AAO 3, no acute distress, pleasant  HEENT: Normocephalic, atraumatic, no discharge in the eyes, no discharge from the nose, there is no oropharyngeal erythema or exudates, there is white/yellow discoloration on the tongue consistent with oral candidiasis and TMs intact bilaterally with no erythema, no cervical tenderness or lymphadenopathy  Cardiovascular: Regular rate and rhythm without appreciable murmur  Respiratory: Clear to auscultation bilaterally no RRW  Gastrointestinal: Soft nontender nondistended with bowel sounds present  Skin: Incision over the left knee is healing appropriately.  No signs of infection.  extremities: No clubbing, cyanosis or edema  Neurologic: CN II through XII grossly intact   Psychiatric: Normal mood and affect           Results  · In-Office Procedures  o Lab procedure  § IOP - Influenza A/B Test (71874)   § Influenza A: Negative   § Influenza B: Negative   § Internal Control Verified?: Yes   § IOP - Rapid Strep (75468)   § Beta Strep Gp A Culture: Negative   § Internal Control Verified?: Yes       Assessment  · Oral candidiasis     112.0/B37.0  · Bronchitis     490/J40  · Status post left knee replacement     V43.65/Z96.652    Problems Reconciled  Plan  · Orders  o ACO-39: Current medications updated and reviewed () - - 08/06/2020  · Medications  o nystatin 100,000 unit/mL oral suspension   SIG: take 5 milliliters (500,000 unit) by oral route 4 times per day for 10 days   DISP: (200) milliliters with 0 refills  Prescribed on 08/06/2020     o ondansetron 4 mg oral tablet,disintegrating   SIG: place 1 tablet on top of the tongue where they will dissolve, then swallow Q6H prn nausea/vomiting   DISP:  (30) tablets with 1 refills  Prescribed on 08/06/2020     o azithromycin 250 mg oral tablet   SIG: take 2 tablets (500 mg) PO for first day then 1 tablet (250 mg) by oral route once daily for 4 days   DISP: (6) tablets with 0 refills  Adjusted on 08/06/2020     o Medications have been Reconciled  o Transition of Care or Provider Policy  · Instructions  o Patient was educated/instructed on their diagnosis, treatment and medications prior to discharge from the clinic today.  o Patient instructed to seek medical attention urgently for new or worsening symptoms.  o Call the office with any concerns or questions.  o I suspect patient has a sore throat due to the intubation from her surgery. She is also noted to have thrush on exam. I will treat her with nystatin swish and swallow. Given the fever I will go ahead and treat her for bronchitis with azithromycin. Given her recent hospitalization we have also ordered a COVID test to rule this out. Patient instructed to quarantine until negative result returns. I will see her back for her next regular scheduled checkup. She will call with any questions or concerns.   · Disposition  o Keep next appointment            Electronically Signed by: Sabas Boyce, DO -Author on August 6, 2020 10:14:53 PM

## 2021-05-13 NOTE — PROGRESS NOTES
Progress Note      Patient Name: Josephine Díaz   Patient ID: [id number]   Sex: Female   YOB: 1955    Primary Care Provider: Sabas Boyce DO   Referring Provider: Sabas Boyce DO    Visit Date: 2020    Provider: Sabas Boyce DO   [place] [facility] [place] [address] [place] [address], [address] [place] [address]  [id number]   Location Phone: [phone]          Chief Complaint  · tired all the time/no energy      History Of Present Illness  Josephine Díaz is a 65 year old /Black female who presents for evaluation and treatment of:      Patient presents today for an acute visit.  She reports feeling tired all the time and not having energy.  She is status post left knee replacement which was done by Dr. Bethel Miller on 2020.  She will have a right knee replaced soon in November.  She will have preoperative labs done for my review again.  She has been seen by Dr. Benjamin recently.  She is being monitored for leukocytosis and anemia.  Unclear etiology at this time.  She will have further evaluation including having a rheumatoid factor and JOHNNIE checked.  She denies any morning joint stiffness.  I did discuss adding a thyroid profile to evaluation.  She declines flu vaccine.       Past Medical History  Allergic rhinitis; Anemia; Arthritis; Asthma; Essential hypertension; Family history of diabetes mellitus; Family history of hypertension; Family History Of Ischemic Heart Disease; Gall stones; Hyperlipidemia; Hypertension, essential; Hypertriglyceridemia; Joint Pain, Multiple Sites; Low back pain; Morbid obesity; Myocardial infarction; Screening Mammogram; Shortness of Breath; Sinus trouble; Sleep apnea, unspecified; Vitamin D deficiency         Past Surgical History  Appendectomy;  section; Cholecystectomy; Colonoscopy; Hysterectomy; Knee replacement, left         Medication List  Advair Diskus 250-50 mcg/dose inhalation  blister with device; albuterol sulfate 2.5 mg /3 mL (0.083 %) inhalation solution for nebulization; Allegra Allergy 180 mg oral tablet; azithromycin 250 mg oral tablet; Calcium 500 + D 500 mg(1,250mg) -200 unit oral tablet; clonidine HCl 0.1 mg oral tablet; cyclobenzaprine 10 mg oral tablet; docusate sodium 100 mg oral tablet; Eliquis 2.5 mg oral tablet; ferrous sulfate 325 mg (65 mg iron) oral tablet; Flonase Allergy Relief 50 mcg/actuation nasal spray,suspension; hydrocodone-acetaminophen 7.5-325 mg oral tablet; ipratropium bromide 0.03 % nasal spray,non-aerosol; ketoconazole 2 % topical cream; Maxzide 75-50 mg oral tablet; montelukast 10 mg oral tablet; nitroglycerin 0.4 mg sublingual tablet, sublingual; Norvasc 5 mg oral tablet; nystatin 100,000 unit/mL oral suspension; omeprazole 20 mg oral capsule,delayed release(DR/EC); ondansetron 4 mg oral tablet,disintegrating; potassium chloride 10 mEq oral tablet extended release; Pulmicort 0.5 mg/2 mL inhalation suspension for nebulization; rosuvastatin 20 mg oral tablet; tolterodine 2 mg oral tablet; Voltaren 1 % topical gel         Allergy List  Benadryl; PENICILLINS; Tomato         Family Medical History  Hypertension; Heart Attack (MI); Diabetes Mellitus, Type II; No family history of colorectal cancer         Reproductive History   0 Para 0 0 0 0       Social History  Alcohol (Never); MWM Goal Statement:; Tobacco (Former)         Immunizations  Name Date Admin   Influenza Refused   Prevnar 13    Shingles          Review of Systems     Gen: Denies any fever, chills, or weight changes  HEENT: Denies any changes in vision or hearing, denies nasal congestion, denies any neck tenderness or lymphadenopathy  Extremities: Denies edema  Psychiatric: Denies any changes in mood or affect  Neurologic: Denies any deficits  Skin: Denies any rashes  Musculoskeletal: She is doing a lot better with her left knee after surgery.  Right knee is still bothering her a lot.  She  contributes a lot of decreased energy due to right knee pain.       Vitals  Date Time BP Position Site L\R Cuff Size HR RR TEMP (F) WT  HT  BMI kg/m2 BSA m2 O2 Sat HC       09/29/2020 03:04 /84 Sitting    88 - R  98.9 210lbs 6oz 5'   41.09 2.01 99 %          Physical Examination     General: AAO 3, no acute distress, pleasant  HEENT: Normocephalic, atraumatic  Cardiovascular: Regular rate and rhythm without appreciable murmur  Respiratory: Clear to auscultation bilaterally no RRW  Gastrointestinal: Soft nontender nondistended with bowel sounds present  extremities: No clubbing, cyanosis or edema  Neurologic: CN II through XII grossly intact   Psychiatric: Normal mood and affect           Assessment  · Fatigue     780.79/R53.83  · Leukocytosis     288.60/D72.829  · Microcytic anemia     280.9/D50.9  · Hypokalemia     276.8/E87.6    Problems Reconciled  Plan  · Orders  o Thyroid Profile (THYII, 46208, 93419) - 780.79/R53.83 - 09/29/2020  o ACO-39: Current medications updated and reviewed (1159F, ) - - 09/29/2020  o ACO-14: Influenza immunization was not administered for reasons documented () - - 09/29/2020   Declines  · Medications  o potassium chloride 10 mEq oral tablet extended release   SIG: take 1 tablet (10 meq) by oral route once daily with food for 90 days   DISP: (90) tablets with 3 refills  Refilled on 09/29/2020     o tolterodine 2 mg oral tablet   SIG: take 1 tablet (2 mg) by oral route daily   DISP: (90) tablets with 3 refills  Refilled on 09/29/2020     o Medications have been Reconciled  o Transition of Care or Provider Policy  · Instructions  o Patient was educated/instructed on their diagnosis, treatment and medications prior to discharge from the clinic today.  o Patient instructed to seek medical attention urgently for new or worsening symptoms.  o Call the office with any concerns or questions.  o I will refill her potassium. She had labs done recently that showed her potassium  level was slightly low at 3.4. She is taking triamterene/hydrochlorothiazide which can certainly contribute to hypokalemia. I will add a thyroid profile to her upcoming labs. I will see patient back in 2 months or sooner if needed. She will call with any questions or concerns.  · Disposition  o Follow Up in 2 weeks.            Electronically Signed by: Sabas Boyce DO -Author on September 29, 2020 05:50:23 PM

## 2021-05-13 NOTE — PROGRESS NOTES
Progress Note      Patient Name: Josephine Díaz   Patient ID: [id number]   Sex: Female   YOB: 1955    Primary Care Provider: Sabas Boyce DO   Referring Provider: Sabas Boyce DO    Visit Date: October 27, 2020    Provider: Sabas Boyce DO   Location: [facility] [place]   Location Address: [address] [place] [address], [address] [place] [address]  [id number]   Location Phone: [phone]          Chief Complaint  · check up      History Of Present Illness  Josephine Díaz is a 65 year old /Black female who presents for evaluation and treatment of:      Patient presents today for checkup.  She was supposed to have a preoperative assessment however she had to cancel her right knee replacement due to her daughter being ill.  She is hopeful to get it done in the near future.  She had her preoperative labs done.  She will request this from [facility] to be sent to us.  She has low potassium level and we will repeat this in 2 weeks.  She is taking 10 mEq a day which previously has worked for her.  Her last potassium level when it was checked back on 10/6/2020 was 3.1.  She is taking Maxide which certainly can contribute to hypokalemia.  She is taking 10 mEq at this time and dissolving and water and taking daily.  She has a 20 mill equivalent tablet.  She reports doing well otherwise.  She will have a corticosteroid injection of her right knee soon by orthopedics.       Past Medical History  Allergic rhinitis; Anemia; Arthritis; Asthma; Essential hypertension; Family history of diabetes mellitus; Family history of hypertension; Family History Of Ischemic Heart Disease; Gall stones; Hyperlipidemia; Hypertension, essential; Hypertriglyceridemia; Joint Pain, Multiple Sites; Low back pain; Morbid obesity; Myocardial infarction; Screening Mammogram; Shortness of Breath; Sinus trouble; Sleep apnea, unspecified; Vitamin D deficiency         Past Surgical History  Appendectomy;   section; Cholecystectomy; Colonoscopy; Hysterectomy; Knee replacement, left         Medication List  Advair Diskus 250-50 mcg/dose inhalation blister with device; albuterol sulfate 2.5 mg /3 mL (0.083 %) inhalation solution for nebulization; Allegra Allergy 180 mg oral tablet; azithromycin 250 mg oral tablet; Calcium 500 + D 500 mg(1,250mg) -200 unit oral tablet; clonidine HCl 0.1 mg oral tablet; cyclobenzaprine 10 mg oral tablet; docusate sodium 100 mg oral tablet; Eliquis 2.5 mg oral tablet; ferrous sulfate 325 mg (65 mg iron) oral tablet; Flonase Allergy Relief 50 mcg/actuation nasal spray,suspension; hydrocodone-acetaminophen 7.5-325 mg oral tablet; ipratropium bromide 0.03 % nasal spray,non-aerosol; ketoconazole 2 % topical cream; Maxzide 75-50 mg oral tablet; montelukast 10 mg oral tablet; nitroglycerin 0.4 mg sublingual tablet, sublingual; Norvasc 5 mg oral tablet; nystatin 100,000 unit/mL oral suspension; omeprazole 20 mg oral capsule,delayed release(DR/EC); ondansetron 4 mg oral tablet,disintegrating; potassium chloride 20 mEq oral tablet extended release; Pulmicort 0.5 mg/2 mL inhalation suspension for nebulization; rosuvastatin 20 mg oral tablet; tolterodine 2 mg oral capsule,extended release 24hr; Voltaren 1 % topical gel         Allergy List  Benadryl; PENICILLINS; Tomato       Allergies Reconciled  Family Medical History  Hypertension; Heart Attack (MI); Diabetes Mellitus, Type II; No family history of colorectal cancer         Reproductive History   0 Para 0 0 0 0       Social History  Alcohol (Never); M Goal Statement:; Tobacco (Former)         Immunizations  Name Date Admin   Influenza Refused   Influenza Refused   Prevnar 13 2020   Shingles 2019         Review of Systems     Gen: Denies any fever, chills, or weight changes  HEENT: Denies any changes in vision or hearing, denies nasal congestion, denies any neck tenderness or lymphadenopathy  Extremities: Denies  edema  Psychiatric: Denies any changes in mood or affect  Neurologic: Denies any deficits  Skin: Denies any rashes  Musculoskeletal: As discussed above       Vitals  Date Time BP Position Site L\R Cuff Size HR RR TEMP (F) WT  HT  BMI kg/m2 BSA m2 O2 Sat FR L/min FiO2 HC       10/27/2020 10:50 /72 Sitting    84 - R  98.2 214lbs 8oz 5'   41.89 2.03 100 %            Physical Examination     General: AAO 3, no acute distress, pleasant  HEENT: Normocephalic, atraumatic  Cardiovascular: Regular rate and rhythm without appreciable murmur  Respiratory: Clear to auscultation bilaterally no RRW  Gastrointestinal: Soft nontender nondistended with bowel sounds present  extremities: No clubbing, cyanosis or edema  Neurologic: CN II through XII grossly intact   Psychiatric: Normal mood and affect           Assessment  · Primary osteoarthritis of right knee     715.16/M17.11  · Hypokalemia     276.8/E87.6  · Hypertension     401.9/I10  Plan as documented above. Plan to repeat her potassium level in 2 weeks. Patient will call with any questions or concerns. We may need to make changes on her antihypertensive regimen if she continues to remain hypokalemic or increased dose of potassium supplementation. Discussed seeing her back in 2 months or sooner if needed.      Plan  · Orders  o ACO-39: Current medications updated and reviewed (1159F, ) - - 10/27/2020  o ACO-14: Influenza immunization was not administered for reasons documented [facility] () - - 10/27/2020   declined  o BMP [facility] ([id number]) - 276.8/E87.6 - 11/10/2020  · Medications  o Medications have been Reconciled  o Transition of Care or Provider Policy  · Instructions  o Patient was educated/instructed on their diagnosis, treatment and medications prior to discharge from the clinic today.  · Disposition  o Follow Up in 2 months.            Electronically Signed by: Sabas Boyce DO -Author on October 27, 2020 11:53:45 AM

## 2021-05-14 VITALS
BODY MASS INDEX: 39.29 KG/M2 | WEIGHT: 200.12 LBS | SYSTOLIC BLOOD PRESSURE: 110 MMHG | OXYGEN SATURATION: 100 % | HEART RATE: 94 BPM | TEMPERATURE: 97.9 F | DIASTOLIC BLOOD PRESSURE: 70 MMHG | HEIGHT: 60 IN

## 2021-05-14 VITALS
OXYGEN SATURATION: 99 % | DIASTOLIC BLOOD PRESSURE: 74 MMHG | WEIGHT: 205.06 LBS | TEMPERATURE: 97.5 F | HEART RATE: 90 BPM | SYSTOLIC BLOOD PRESSURE: 124 MMHG | BODY MASS INDEX: 40.26 KG/M2 | HEIGHT: 60 IN

## 2021-05-14 VITALS
WEIGHT: 210.37 LBS | OXYGEN SATURATION: 99 % | HEIGHT: 60 IN | HEART RATE: 88 BPM | SYSTOLIC BLOOD PRESSURE: 132 MMHG | TEMPERATURE: 98.9 F | BODY MASS INDEX: 41.3 KG/M2 | DIASTOLIC BLOOD PRESSURE: 84 MMHG

## 2021-05-14 VITALS
OXYGEN SATURATION: 100 % | BODY MASS INDEX: 42.11 KG/M2 | TEMPERATURE: 98.2 F | SYSTOLIC BLOOD PRESSURE: 110 MMHG | DIASTOLIC BLOOD PRESSURE: 72 MMHG | HEART RATE: 84 BPM | HEIGHT: 60 IN | WEIGHT: 214.5 LBS

## 2021-05-14 VITALS
SYSTOLIC BLOOD PRESSURE: 110 MMHG | DIASTOLIC BLOOD PRESSURE: 88 MMHG | TEMPERATURE: 98.1 F | BODY MASS INDEX: 39.27 KG/M2 | HEART RATE: 85 BPM | OXYGEN SATURATION: 100 % | HEIGHT: 60 IN | WEIGHT: 200 LBS

## 2021-05-14 VITALS
HEIGHT: 60 IN | BODY MASS INDEX: 42.48 KG/M2 | OXYGEN SATURATION: 98 % | TEMPERATURE: 97.3 F | SYSTOLIC BLOOD PRESSURE: 124 MMHG | WEIGHT: 216.37 LBS | HEART RATE: 93 BPM | DIASTOLIC BLOOD PRESSURE: 76 MMHG

## 2021-05-14 VITALS
DIASTOLIC BLOOD PRESSURE: 84 MMHG | HEART RATE: 66 BPM | OXYGEN SATURATION: 99 % | SYSTOLIC BLOOD PRESSURE: 138 MMHG | BODY MASS INDEX: 40.16 KG/M2 | HEIGHT: 60 IN | WEIGHT: 204.56 LBS | TEMPERATURE: 97.9 F

## 2021-05-14 NOTE — PROGRESS NOTES
Progress Note      Patient Name: Josephine Díaz   Patient ID: [id number]   Sex: Female   YOB: 1955    Primary Care Provider: Sabas Boyce DO   Referring Provider: Sabas Boyce DO    Visit Date: April 12, 2021    Provider: Sabas Boyce DO   Location: [facility]   Location Address: [address] [place] [address], [address] [place] [address]  [id number]   Location Phone: [phone]          Chief Complaint  · low back pain      History Of Present Illness  Josephine Díaz is a 65 year old /Black female who presents for evaluation and treatment of:      Patient presents today for an acute visit.  She reports that she has right leg pain and low back pain.  The low back pain radiates into the right buttocks area.  We have previously discussed her low back pain and I ordered an MRI back on 10/4/2019 which revealed broad-based bulging of the annulus at L3-L4 causing moderate right neural foraminal narrowing as well as moderate to severe on the left.  There is broad-based disc bulging of the annulus which extends into the more anterior inferior aspect of both intervertebral foramina.  There is central canal stenosis as well as narrowing of the intervertebral foramina bilaterally.  She is on Flexeril but reports not taking it for quite some time now.  She will check at home to see if she still has it and if she does not I discussed sending in a refill.  She is supposed to be moving to [place] at the end of this month.  She reports that she report her house up for sale soon.  She is not interested in any referral at this time for pain management.  I did discuss starting her on duloxetine.  Risk and benefits were discussed with her.       Past Medical History  Allergic rhinitis; Anemia; Arthritis; Asthma; Essential hypertension; Family history of diabetes mellitus; Family history of hypertension; Family History Of Ischemic Heart Disease; Gall stones; Hyperlipidemia;  Hypertension, essential; Hypertriglyceridemia; Joint Pain, Multiple Sites; Low back pain; Morbid obesity; Myocardial infarction; Screening Mammogram; Shortness of Breath; Sinus trouble; Sleep apnea, unspecified; Vitamin D deficiency         Past Surgical History  Appendectomy;  section; Cholecystectomy; Colonoscopy; Hysterectomy; Knee replacement, left; Knee replacement, right         Medication List  Advair Diskus 250-50 mcg/dose inhalation blister with device; albuterol sulfate 2.5 mg /3 mL (0.083 %) inhalation solution for nebulization; Allegra Allergy 180 mg oral tablet; Calcium 500 + D 500 mg(1,250mg) -200 unit oral tablet; ciclopirox 8 % topical solution; clonidine HCl 0.1 mg oral tablet; cyclobenzaprine 10 mg oral tablet; docusate sodium 100 mg oral tablet; erythromycin 5 mg/gram (0.5 %) ophthalmic (eye) ointment; ferrous sulfate 325 mg (65 mg iron) oral tablet; Flonase Allergy Relief 50 mcg/actuation nasal spray,suspension; ipratropium bromide 0.03 % nasal spray,non-aerosol; ketoconazole 2 % topical cream; Maxzide 75-50 mg oral tablet; metronidazole 500 mg oral tablet; montelukast 10 mg oral tablet; nitroglycerin 0.4 mg sublingual tablet, sublingual; Norvasc 5 mg oral tablet; nystatin 100,000 unit/mL oral suspension; omeprazole 20 mg oral capsule,delayed release(DR/EC); ondansetron 4 mg oral tablet,disintegrating; Pataday 0.1 % ophthalmic (eye) drops; potassium chloride 20 mEq oral tablet extended release; Pulmicort 0.5 mg/2 mL inhalation suspension for nebulization; rosuvastatin 20 mg oral tablet; tolterodine 2 mg oral capsule,extended release 24hr; triamterene-hydrochlorothiazid 75-50 mg oral tablet; Voltaren 1 % topical gel         Allergy List  Benadryl; PENICILLINS; Tomato       Allergies Reconciled  Family Medical History  Hypertension; Heart Attack (MI); Diabetes Mellitus, Type II; No family history of colorectal cancer         Reproductive History   0 Para 0 0 0 0       Social  History  Alcohol (Never); MWM Goal Statement:; Tobacco (Former)         Immunizations  Name Date Admin   Influenza Refused   Influenza Refused   Prevnar 13 02/25/2020   Shingles 02/06/2019         Review of Systems     Gen: Denies any fever, chills, or weight changes  Extremities: Denies edema  Psychiatric: Denies any changes in mood or affect  Neurologic: As discussed above  Musculoskeletal: As discussed above  Skin: Denies any rashes       Vitals  Date Time BP Position Site L\R Cuff Size HR RR TEMP (F) WT  HT  BMI kg/m2 BSA m2 O2 Sat FR L/min FiO2 HC       04/12/2021 10:15 /70 Sitting    94 - R  97.9 200lbs 2oz 5'   39.08 1.96 100 %            Physical Examination     General: AAO 3, no acute distress, pleasant  HEENT: Normocephalic, atraumatic  Cardiovascular: Regular rate and rhythm without appreciable murmur  Respiratory: Clear to auscultation bilaterally no RRW  Gastrointestinal: Soft nontender nondistended with bowel sounds present  Musculoskeletal: Paraspinal hypertonicity of the lumbar spine.  No tenderness to palpation over the left or right buttocks area.  Negative straight leg raise bilaterally.  extremities: No edema  Neurologic: CN II through XII grossly intact   Psychiatric: Normal mood and affect           Assessment  · DDD (degenerative disc disease), lumbar     722.52/M51.36  · Lumbar radiculopathy     724.4/M54.16      Plan  · Orders  o ACO-39: Current medications updated and reviewed (1159F, ) - - 04/12/2021  · Medications  o duloxetine 30 mg oral capsule,delayed release(DR/EC)   SIG: take 1 capsule (30 mg) by oral route once daily   DISP: (90) Capsule with 1 refills  Prescribed on 04/12/2021     o Medications have been Reconciled  o Transition of Care or Provider Policy  · Instructions  o Patient was educated/instructed on their diagnosis, treatment and medications prior to discharge from the clinic today.  o Discussed treatment options with patient. She will be leaving soon at the  end of this month. I discussed starting her on duloxetine. Risk and benefits were discussed. Patient instructed to call with any questions or concerns. Future options would include referral to pain management and/or neurosurgery. Patient encouraged to come back and see me if she has any other issues or concerns in the meantime.  · Disposition  o Follow Up PRN.            Electronically Signed by: Sabas Boyce DO -Author on April 12, 2021 12:31:58 PM

## 2021-05-14 NOTE — PROGRESS NOTES
Quick Note      Patient Name: Josephine Díaz   Patient ID: [id number]   Sex: Female   YOB: 1955    Primary Care Provider: Sabas Boyce DO   Referring Provider: Sabas Boyce DO    Visit Date: December 28, 2020    Provider: Sabas Boyce DO   Location: [facility] [place]   Location Address: [address] [place] [address], [address] [place] [address]  [id number]   Location Phone: [phone]          History Of Present Illness  TELEHEALTH TELEPHONE VISIT  Josephine Díaz is a 65 year old /Black female who is presenting for evaluation via telehealth telephone visit. Verbal consent obtained before beginning visit.   Provider spent 15 minutes with patient during telehealth visit.   The following staff were present during this visit: provider only   Past Medical History/Overview of Patient Symptoms     Patient presents for telehealth/telephone visit.  She is unaccompanied.  She provides verbal consent to visit.  I spent 15 minutes on medical discussion with patient.  Unfortunately her daughter, Sonia Bender just passed away on 12/3/2020.  It was sudden and unexpected.  She is grieving her daughter's loss.  She will be working with a counselor at her [facility] but also told her that she can get a list of counselors in the area provided at her office.  She will have a right knee replacement in February and will have an appointment in January to schedule this and then will schedule a preoperative visit with myself.  Her vitals she gave me today look good.  Blood pressure is 123/83 with pulse of 84 bpm and weight was 209 pounds which is down 5 pounds from last visit.  She does have itchy/watery eyes which has been going on for the past couple weeks.  She does have a history of allergies.  I will prescribe her eyedrops, Pataday.  She is still due to have her potassium checked and will get this done at her earliest convenience.  She is describing some dry skin issues on her face as  well as her upper chest area.  I'm unable to fully assess this in office at this time and if it continues to give her issues instruct her to make an appointment.  She may use Cetaphil cream to see if this helps out and again if not I encouraged her to make an appointment.           Assessment  · Bereavement     V62.82/Z63.4  · Stress     V62.89/F43.9  · Dry skin     701.1/L85.3  · Hypokalemia     276.8/E87.6  · Allergic conjunctivitis     372.14/H10.10      Plan  · Orders  o ACO-39: Current medications updated and reviewed (, 1159F) - - 12/28/2020  o Physician Telephone Evaluation, 11-20 minutes (26579) - - 12/28/2020  · Medications  o Pataday 0.1 % ophthalmic (eye) drops   SIG: instill 1 drop into affected eye(s) by ophthalmic route 2 times per day at an interval of 6 to 8 hours   DISP: (5) Milliliter with 3 refills  Prescribed on 12/28/2020     o Calcium 500 + D 500 mg(1,250mg) -200 unit oral tablet   SIG: take 1 tablet by oral route daily for 90 days   DISP: (90) Tablet with 3 refills  Refilled on 12/28/2020     · Instructions  o Plan Of Care:   o Take all medications as prescribed/directed.  o Plan as documented above. Discussed seeing patient back for her preoperative visit which she will call and schedule at her convenience after she is seen by orthopedics.  · Disposition  o Call or Return if symptoms worsen or persist.  o Keep next appointment            Electronically Signed by: Sabas Boyce DO -Author on December 28, 2020 10:31:23 AM

## 2021-05-14 NOTE — PROGRESS NOTES
Progress Note      Patient Name: Josephine Díaz   Patient ID: [id number]   Sex: Female   YOB: 1955    Primary Care Provider: Sabas Boyce DO   Referring Provider: Sabas Boyce DO    Visit Date: 2021    Provider: Sabas Boyce DO   Location: [facility]   Location Address: [address] [place] [address], [address] [place] [address]  [id number]   Location Phone: [phone]          Chief Complaint  · surgical clearance      History Of Present Illness  Josephine Díaz is a 65 year old /Black female who presents for evaluation and treatment of:      Patient presents today for surgical clearance.  She has a total right knee replacement scheduled on 2021 with Dr. Miller.  Her preop labs been reviewed.  She has a stable anemia which is being evaluated by hematology.  Her sodium and potassium were normal.  EKG with no acute findings.  She has normal sinus rhythm.  She denies any chest pain.  She will see her cardiologist, Dr. Luis E Simpson soon for clearance as well.  She does have toenail fungus of the first digit of the right foot.       Past Medical History  Allergic rhinitis; Anemia; Arthritis; Asthma; Essential hypertension; Family history of diabetes mellitus; Family history of hypertension; Family History Of Ischemic Heart Disease; Gall stones; Hyperlipidemia; Hypertension, essential; Hypertriglyceridemia; Joint Pain, Multiple Sites; Low back pain; Morbid obesity; Myocardial infarction; Screening Mammogram; Shortness of Breath; Sinus trouble; Sleep apnea, unspecified; Vitamin D deficiency         Past Surgical History  Appendectomy;  section; Cholecystectomy; Colonoscopy; Hysterectomy; Knee replacement, left         Medication List  Advair Diskus 250-50 mcg/dose inhalation blister with device; albuterol sulfate 2.5 mg /3 mL (0.083 %) inhalation solution for nebulization; Allegra Allergy 180 mg oral tablet; Calcium 500 + D 500 mg(1,250mg) -200  unit oral tablet; clonidine HCl 0.1 mg oral tablet; cyclobenzaprine 10 mg oral tablet; docusate sodium 100 mg oral tablet; ferrous sulfate 325 mg (65 mg iron) oral tablet; Flonase Allergy Relief 50 mcg/actuation nasal spray,suspension; ipratropium bromide 0.03 % nasal spray,non-aerosol; ketoconazole 2 % topical cream; Maxzide 75-50 mg oral tablet; montelukast 10 mg oral tablet; nitroglycerin 0.4 mg sublingual tablet, sublingual; Norvasc 5 mg oral tablet; nystatin 100,000 unit/mL oral suspension; omeprazole 20 mg oral capsule,delayed release(DR/EC); ondansetron 4 mg oral tablet,disintegrating; Pataday 0.1 % ophthalmic (eye) drops; potassium chloride 20 mEq oral tablet extended release; Pulmicort 0.5 mg/2 mL inhalation suspension for nebulization; rosuvastatin 20 mg oral tablet; tolterodine 2 mg oral capsule,extended release 24hr; Voltaren 1 % topical gel         Allergy List  Benadryl; PENICILLINS; Tomato         Family Medical History  Hypertension; Heart Attack (MI); Diabetes Mellitus, Type II; No family history of colorectal cancer         Reproductive History   0 Para 0 0 0 0       Social History  Alcohol (Never); [facility] Goal Statement:; Tobacco (Former)         Immunizations  Name Date Admin   Influenza Refused   Influenza Refused   Prevnar 13 2020   Shingles 2019         Review of Systems     Gen: Denies any fever, chills, or weight changes  Cardiovascular: Denies any chest pain  Extremities: Denies edema  Psychiatric: Denies any changes in mood or affect  Neurologic: Denies any deficits  Skin: Onychomycosis       Vitals  Date Time BP Position Site L\R Cuff Size HR RR TEMP (F) WT  HT  BMI kg/m2 BSA m2 O2 Sat FR L/min FiO2 HC       2021 01:36 /74 Sitting    90 - R  97.5 205lbs 1oz 5'   40.05 1.98 99 %            Physical Examination     General: AAO 3, no acute distress, pleasant  HEENT: Normocephalic, atraumatic  Cardiovascular: Regular rate and rhythm without appreciable  murmur  Respiratory: Clear to auscultation bilaterally no RRW  Gastrointestinal: Soft nontender nondistended with bowel sounds present  Skin: Onychomycosis of first digit of right foot  extremities: No edema  Neurologic: CN II through XII grossly intact   Psychiatric: Normal mood and affect           Assessment  · Onychomycosis     110.1/B35.1  · Right knee pain     719.46/M25.561  Patient overall doing well. She may have surgery from medical standpoint. She will be cleared by cardiology as well. Her paperwork will be signed and faxed. I will start her on ciclopirox for onychomycosis. Patient will be moving at the first part of May. I have asked for her to come and see me prior to departure at the end of April.      Plan  · Orders  o ACO-14: Influenza immunization administered or previously received [facility] () - - 02/08/2021  o ACO-39: Current medications updated and reviewed (, 1159F) - - 02/08/2021  · Medications  o ciclopirox 8 % topical solution   SIG: apply to the affected area(s) by topical route once daily preferably at bedtime or 8 hours before washing   DISP: (1) Bottle with 3 refills  Prescribed on 02/08/2021     · Instructions  o Patient was educated/instructed on their diagnosis, treatment and medications prior to discharge from the clinic today.  o Patient instructed to seek medical attention urgently for new or worsening symptoms.  o Call the office with any concerns or questions.  · Disposition  o Follow Up in 2 months.            Electronically Signed by: Sabas Boyce DO -Author on February 8, 2021 02:43:48 PM

## 2021-05-14 NOTE — PROGRESS NOTES
Progress Note      Patient Name: Josephine Díaz   Patient ID: [id number]   Sex: Female   YOB: 1955    Primary Care Provider: Sabas Boyce DO   Referring Provider: Sabas Boyce DO    Visit Date: 2021    Provider: Sabas Boyce DO   Location: [facility]   Location Address: [address] [place] [address], [address] [place] [address]  [id number]   Location Phone: [phone]          Chief Complaint  · vaginal discharge      History Of Present Illness  Josephine Díaz is a 65 year old /Black female who presents for evaluation and treatment of:      Patient presents today for an acute visit.  Last time I saw her she had bacterial vaginosis.  She took the Flagyl as prescribed.  She reports that symptoms resolved however now she has noticed a discharge again and a foul odor.  She denies being sexually active.  She denies any burning with urination.  Urine dip today did show hematuria.  I will send off urine for culture.  Vaginal swab has been done.  Patient denies any fever or chills.       Past Medical History  Allergic rhinitis; Anemia; Arthritis; Asthma; Essential hypertension; Family history of diabetes mellitus; Family history of hypertension; Family History Of Ischemic Heart Disease; Gall stones; Hyperlipidemia; Hypertension, essential; Hypertriglyceridemia; Joint Pain, Multiple Sites; Low back pain; Morbid obesity; Myocardial infarction; Screening Mammogram; Shortness of Breath; Sinus trouble; Sleep apnea, unspecified; Vitamin D deficiency         Past Surgical History  Appendectomy;  section; Cholecystectomy; Colonoscopy; Hysterectomy; Knee replacement, left         Medication List  Advair Diskus 250-50 mcg/dose inhalation blister with device; albuterol sulfate 2.5 mg /3 mL (0.083 %) inhalation solution for nebulization; Allegra Allergy 180 mg oral tablet; Calcium 500 + D 500 mg(1,250mg) -200 unit oral tablet; ciclopirox 8 % topical solution;  clonidine HCl 0.1 mg oral tablet; cyclobenzaprine 10 mg oral tablet; docusate sodium 100 mg oral tablet; erythromycin 5 mg/gram (0.5 %) ophthalmic (eye) ointment; ferrous sulfate 325 mg (65 mg iron) oral tablet; Flonase Allergy Relief 50 mcg/actuation nasal spray,suspension; ipratropium bromide 0.03 % nasal spray,non-aerosol; ketoconazole 2 % topical cream; Maxzide 75-50 mg oral tablet; metronidazole 500 mg oral tablet; montelukast 10 mg oral tablet; nitroglycerin 0.4 mg sublingual tablet, sublingual; Norvasc 5 mg oral tablet; nystatin 100,000 unit/mL oral suspension; omeprazole 20 mg oral capsule,delayed release(DR/EC); ondansetron 4 mg oral tablet,disintegrating; Pataday 0.1 % ophthalmic (eye) drops; potassium chloride 20 mEq oral tablet extended release; Pulmicort 0.5 mg/2 mL inhalation suspension for nebulization; rosuvastatin 20 mg oral tablet; tolterodine 2 mg oral capsule,extended release 24hr; triamterene-hydrochlorothiazid 75-50 mg oral tablet; Voltaren 1 % topical gel         Allergy List  Benadryl; PENICILLINS; Tomato         Family Medical History  Hypertension; Heart Attack (MI); Diabetes Mellitus, Type II; No family history of colorectal cancer         Reproductive History   0 Para 0 0 0 0       Social History  Alcohol (Never); [facility] Goal Statement:; Tobacco (Former)         Immunizations  Name Date Admin   Influenza Refused   Influenza Refused   Prevnar 13 2020   Shingles 2019         Review of Systems     Gen: Denies any fever, chills  Genitourinary: As discussed above  Skin: Denies any rashes       Vitals  Date Time BP Position Site L\R Cuff Size HR RR TEMP (F) WT  HT  BMI kg/m2 BSA m2 O2 Sat FR L/min FiO2 HC       2021 03:22 /88 Sitting    85 - R  98.1 200lbs 0oz 5'   39.06 1.96 100 %            Physical Examination     General: AAO 3, no acute distress, pleasant  HEENT: Normocephalic, atraumatic  Cardiovascular: Regular rate and rhythm without appreciable  murmur  Respiratory: Clear to auscultation bilaterally no RRW  Gastrointestinal: Soft nontender nondistended with bowel sounds present  extremities: No edema  Neurologic: CN II through XII grossly intact   Psychiatric: Normal mood and affect           Results  · In-Office Procedures  o Lab procedure  § IOP - Urinalysis without Microscopy ([place]) [facility] ([id number])   § Color Ur: Yellow   § Clarity Ur: Clear   § Glucose Ur Ql Strip: Negative   § Bilirub Ur Ql Strip: Negative   § Ketones Ur Ql Strip: Negative   § Sp Gr Ur Qn: 1.020   § Hgb Ur Ql Strip: Trace-Intact   § pH Ur-LsCnc: 7.5   § Prot Ur Ql Strip: Negative   § Urobilinogen Ur Strip-mCnc: 0.2 E.U./dL   § Nitrite Ur Ql Strip: Negative   § WBC Est Ur Ql Strip: Negative       Assessment  · Vaginal discharge     623.5/N89.8  · Hematuria     599.70/R31.9      Plan  · Orders  o ACO-14: Influenza immunization was not administered for reasons documented [facility] () - - 03/31/2021   declines  o ACO-39: Current medications updated and reviewed (1159F, ) - - 03/31/2021  o Vag Screen Infectious agent detection by nucleic acid DNA or RNA (83636) - 623.5/N89.8 - 03/31/2021  o Urine Culture (Clean Catch) [facility] ([id number]) - 599.70/R31.9 - 03/31/2021  · Medications  o Medications have been Reconciled  o Transition of Care or Provider Policy  · Instructions  o Patient was educated/instructed on their diagnosis, treatment and medications prior to discharge from the clinic today.  o Patient instructed to seek medical attention urgently for new or worsening symptoms.  o Call the office with any concerns or questions.  o Patient has been swabbed again. Further recommendations to follow as results return. Urine culture has been sent off. Patient instructed to call with any questions or concerns. I will see her back as needed should any acute issues come up in the meantime. She will be leaving for [place] on 4/30/2021.  · Disposition  o Call or Return if symptoms worsen or  persist.  o Follow Up PRN.            Electronically Signed by: Sabas Boyce DO -Author on March 31, 2021 05:15:53 PM

## 2021-05-14 NOTE — PROGRESS NOTES
Progress Note      Patient Name: Josephine Díaz   Patient ID: [id number]   Sex: Female   YOB: 1955    Primary Care Provider: Sabas Boyce DO   Referring Provider: Sabas Boyce DO    Visit Date: February 17, 2021    Provider: Sabas Boyce DO   Location: [facility]   Location Address: [address] [place] [address], [address] [place] [address]  [id number]   Location Phone: [phone]          Chief Complaint  · eye, left eye      History Of Present Illness  Josephine Díaz is a 65 year old /Black female who presents for evaluation and treatment of:      Patient presents for an acute visit. She reports that starting about 5 days ago she started developing a redness and swelling in the left lower eyelid. She reports it is painful and she notices a bump. She has had some drainage including a yellowish discharge that mouth her left eye shut in the morning. She reports having a little bit more swelling in the left lower eyelid this morning but it has improved. She denies any vision changes. She is also had some chest congestion for the past few days. She has been taking over-the-counter Mucinex which has been helping. She denies any shortness of breath. Denies any fever chills. Denies any recent sick contacts.  She also complains of some vaginal odor that started a couple days ago. She also has had some pain in the left lower back area. She denies any burning with urination.       Past Medical History  Allergic rhinitis; Anemia; Arthritis; Asthma; Essential hypertension; Family history of diabetes mellitus; Family history of hypertension; Family History Of Ischemic Heart Disease; Gall stones; Hyperlipidemia; Hypertension, essential; Hypertriglyceridemia; Joint Pain, Multiple Sites; Low back pain; Morbid obesity; Myocardial infarction; Screening Mammogram; Shortness of Breath; Sinus trouble; Sleep apnea, unspecified; Vitamin D deficiency         Past Surgical  History  Appendectomy;  section; Cholecystectomy; Colonoscopy; Hysterectomy; Knee replacement, left         Medication List  Advair Diskus 250-50 mcg/dose inhalation blister with device; albuterol sulfate 2.5 mg /3 mL (0.083 %) inhalation solution for nebulization; Allegra Allergy 180 mg oral tablet; Calcium 500 + D 500 mg(1,250mg) -200 unit oral tablet; ciclopirox 8 % topical solution; clonidine HCl 0.1 mg oral tablet; cyclobenzaprine 10 mg oral tablet; docusate sodium 100 mg oral tablet; ferrous sulfate 325 mg (65 mg iron) oral tablet; Flonase Allergy Relief 50 mcg/actuation nasal spray,suspension; ipratropium bromide 0.03 % nasal spray,non-aerosol; ketoconazole 2 % topical cream; Maxzide 75-50 mg oral tablet; montelukast 10 mg oral tablet; nitroglycerin 0.4 mg sublingual tablet, sublingual; Norvasc 5 mg oral tablet; nystatin 100,000 unit/mL oral suspension; omeprazole 20 mg oral capsule,delayed release(DR/EC); ondansetron 4 mg oral tablet,disintegrating; Pataday 0.1 % ophthalmic (eye) drops; potassium chloride 20 mEq oral tablet extended release; Pulmicort 0.5 mg/2 mL inhalation suspension for nebulization; rosuvastatin 20 mg oral tablet; tolterodine 2 mg oral capsule,extended release 24hr; Voltaren 1 % topical gel         Allergy List  Benadryl; PENICILLINS; Tomato         Family Medical History  Hypertension; Heart Attack (MI); Diabetes Mellitus, Type II; No family history of colorectal cancer         Reproductive History   0 Para 0 0 0 0       Social History  Alcohol (Never); [facility] Goal Statement:; Tobacco (Former)         Immunizations  Name Date Admin   Influenza Refused   Influenza Refused   Prevnar 13 2020   Shingles 2019         Review of Systems     Gen: Denies any fever, chills, or weight changes  HEENT: As discussed above  Respiratory: As discussed above  Genitourinary: As discussed above  Extremities: Denies edema  Psychiatric: Denies any changes in mood or affect  Neurologic:  Denies any deficits  Skin: Denies any rashes       Vitals  Date Time BP Position Site L\R Cuff Size HR RR TEMP (F) WT  HT  BMI kg/m2 BSA m2 O2 Sat FR L/min FiO2 HC       02/17/2021 10:08 /84 Sitting    66 - R  97.9 204lbs 9oz 5'   39.95 1.98 99 %      02/17/2021 10:08 /84 Sitting    66 - R  97.9 204lbs 9oz 5'   39.95 1.98 99 %      02/17/2021 10:08 /84 Sitting    66 - R  97.9 204lbs 9oz 5'   39.95 1.98 99 %      02/17/2021 10:12 /84 Sitting    66 - R  97.9 204lbs 9oz 5'   39.95 1.98 99 %      02/17/2021 10:12 /84 Sitting    66 - R  97.9 204lbs 9oz 5'   39.95 1.98 99 %            Physical Examination     General: AAO 3, no acute distress, pleasant  HEENT: Normocephalic, she has a slightly erythematous lower eyelid swelling noted on the lateral aspect. It is more internal. Looking on the inside of the lower eyelid she does have some white/yellow discharge noted. I am able to express a small amount from the stye. Tender to palpation. Mildly injected conjunctiva on the left  Cardiovascular: Regular rate and rhythm without appreciable murmur  Respiratory: Clear to auscultation bilaterally no RRW  Gastrointestinal: Soft nontender nondistended with bowel sounds present  Genitourinary: No suprapubic tenderness to palpation. No flank tenderness to palpation  extremities: No edema  Neurologic: CN II through XII grossly intact   Psychiatric: Normal mood and affect           Assessment  · Stye, left lower eyelid     373.11/H00.019  · Conjunctivitis, left     372.30/H10.9  · Vaginal odor     625.8/N89.8      Plan  · Orders  o ACO-39: Current medications updated and reviewed (1159F, ) - - 02/17/2021  o Vag Screen Infectious agent detection by nucleic acid DNA or RNA (44421) - 625.8/N89.8 - 02/17/2021  · Medications  o erythromycin 5 mg/gram (0.5 %) ophthalmic (eye) ointment   SIG: apply 1 cm ribbon into the lower conjunctival sac in the left eye by ophthalmic route 4 times per day until improved    DISP: (1) Tube with 0 refills  Prescribed on 02/17/2021     o Medications have been Reconciled  o Transition of Care or Provider Policy  · Instructions  o Patient was educated/instructed on their diagnosis, treatment and medications prior to discharge from the clinic today.  o Patient instructed to seek medical attention urgently for new or worsening symptoms.  o Call the office with any concerns or questions.  o Patient presenting with a stye. She does have mild conjunctivitis of the left eye as well. I will give her erythromycin eye ointment. I encouraged her to do warm compresses for 5 to 10 minutes 3-5 times a day to help facilitate drainage. If she continues to have symptoms or if it worsens she is instructed to call or return as we would need to consider ophthalmology referral at that time for definitive I&D. I was able to express some manually today so I am hopeful that she will start to improve soon. Vaginal screen will be sent out. Patient unfortunately could not leave a urine sample today despite being given 2 bottles of water. She denies any dysuria. If she does develop the symptoms she can always come back in and we can do this for her but I do not suspect urinary tract infection at this time. Patient instructed continue using Mucinex as needed. I do not suspect that she has an infection requiring antibiotics at this time. She has no systemic symptoms otherwise.   · Disposition  o Call or Return if symptoms worsen or persist.  o Follow Up PRN.            Electronically Signed by: Sabas Boyce DO -Author on February 17, 2021 11:24:04 AM

## 2021-05-15 VITALS
SYSTOLIC BLOOD PRESSURE: 142 MMHG | HEIGHT: 60 IN | WEIGHT: 198.25 LBS | HEART RATE: 79 BPM | TEMPERATURE: 98.2 F | DIASTOLIC BLOOD PRESSURE: 86 MMHG | BODY MASS INDEX: 38.92 KG/M2 | OXYGEN SATURATION: 99 %

## 2021-05-15 VITALS
WEIGHT: 213.25 LBS | HEIGHT: 60 IN | HEART RATE: 113 BPM | SYSTOLIC BLOOD PRESSURE: 124 MMHG | TEMPERATURE: 96.6 F | OXYGEN SATURATION: 98 % | DIASTOLIC BLOOD PRESSURE: 68 MMHG | BODY MASS INDEX: 41.87 KG/M2

## 2021-05-15 VITALS
HEIGHT: 60 IN | HEART RATE: 76 BPM | TEMPERATURE: 98 F | BODY MASS INDEX: 40.05 KG/M2 | WEIGHT: 204 LBS | OXYGEN SATURATION: 100 % | SYSTOLIC BLOOD PRESSURE: 182 MMHG | DIASTOLIC BLOOD PRESSURE: 104 MMHG

## 2021-05-15 VITALS
SYSTOLIC BLOOD PRESSURE: 156 MMHG | DIASTOLIC BLOOD PRESSURE: 90 MMHG | HEIGHT: 60 IN | BODY MASS INDEX: 42.41 KG/M2 | OXYGEN SATURATION: 98 % | HEART RATE: 99 BPM | TEMPERATURE: 98.5 F | WEIGHT: 216 LBS

## 2021-05-15 VITALS
HEIGHT: 60 IN | BODY MASS INDEX: 44.22 KG/M2 | OXYGEN SATURATION: 98 % | SYSTOLIC BLOOD PRESSURE: 122 MMHG | DIASTOLIC BLOOD PRESSURE: 72 MMHG | TEMPERATURE: 97.6 F | WEIGHT: 225.25 LBS | HEART RATE: 105 BPM

## 2021-05-15 VITALS
HEART RATE: 81 BPM | TEMPERATURE: 98.2 F | WEIGHT: 210 LBS | SYSTOLIC BLOOD PRESSURE: 136 MMHG | OXYGEN SATURATION: 98 % | DIASTOLIC BLOOD PRESSURE: 84 MMHG | BODY MASS INDEX: 41.23 KG/M2 | HEIGHT: 60 IN

## 2021-05-15 VITALS
HEIGHT: 60 IN | DIASTOLIC BLOOD PRESSURE: 70 MMHG | TEMPERATURE: 97.9 F | SYSTOLIC BLOOD PRESSURE: 114 MMHG | HEART RATE: 90 BPM | BODY MASS INDEX: 40.64 KG/M2 | WEIGHT: 207 LBS | OXYGEN SATURATION: 98 %

## 2021-05-15 VITALS
DIASTOLIC BLOOD PRESSURE: 86 MMHG | SYSTOLIC BLOOD PRESSURE: 138 MMHG | HEIGHT: 60 IN | BODY MASS INDEX: 47.36 KG/M2 | TEMPERATURE: 97.7 F | WEIGHT: 241.25 LBS | OXYGEN SATURATION: 99 % | HEART RATE: 88 BPM

## 2021-05-15 VITALS
BODY MASS INDEX: 43.66 KG/M2 | DIASTOLIC BLOOD PRESSURE: 74 MMHG | OXYGEN SATURATION: 100 % | TEMPERATURE: 98.1 F | SYSTOLIC BLOOD PRESSURE: 128 MMHG | HEIGHT: 60 IN | WEIGHT: 222.37 LBS | HEART RATE: 93 BPM

## 2021-05-15 VITALS
HEIGHT: 60 IN | OXYGEN SATURATION: 97 % | BODY MASS INDEX: 40.05 KG/M2 | HEART RATE: 79 BPM | DIASTOLIC BLOOD PRESSURE: 82 MMHG | SYSTOLIC BLOOD PRESSURE: 138 MMHG | TEMPERATURE: 98.4 F | WEIGHT: 204 LBS

## 2021-05-15 VITALS
BODY MASS INDEX: 40.44 KG/M2 | WEIGHT: 206 LBS | OXYGEN SATURATION: 99 % | DIASTOLIC BLOOD PRESSURE: 84 MMHG | SYSTOLIC BLOOD PRESSURE: 138 MMHG | HEIGHT: 60 IN | TEMPERATURE: 97.6 F | HEART RATE: 76 BPM

## 2021-05-15 VITALS
OXYGEN SATURATION: 94 % | BODY MASS INDEX: 42.41 KG/M2 | TEMPERATURE: 99.4 F | HEART RATE: 110 BPM | SYSTOLIC BLOOD PRESSURE: 150 MMHG | WEIGHT: 216 LBS | DIASTOLIC BLOOD PRESSURE: 100 MMHG | HEIGHT: 60 IN

## 2021-05-15 VITALS
SYSTOLIC BLOOD PRESSURE: 150 MMHG | HEART RATE: 92 BPM | WEIGHT: 213.37 LBS | TEMPERATURE: 97.8 F | OXYGEN SATURATION: 98 % | DIASTOLIC BLOOD PRESSURE: 90 MMHG | HEIGHT: 60 IN | BODY MASS INDEX: 41.89 KG/M2

## 2021-05-15 VITALS
HEART RATE: 100 BPM | DIASTOLIC BLOOD PRESSURE: 74 MMHG | BODY MASS INDEX: 40.69 KG/M2 | WEIGHT: 207.25 LBS | HEIGHT: 60 IN | OXYGEN SATURATION: 99 % | SYSTOLIC BLOOD PRESSURE: 128 MMHG | TEMPERATURE: 98.4 F

## 2021-05-15 VITALS
HEIGHT: 60 IN | OXYGEN SATURATION: 98 % | DIASTOLIC BLOOD PRESSURE: 98 MMHG | TEMPERATURE: 98.3 F | BODY MASS INDEX: 42.24 KG/M2 | HEART RATE: 92 BPM | WEIGHT: 215.12 LBS | SYSTOLIC BLOOD PRESSURE: 162 MMHG

## 2021-05-16 VITALS
DIASTOLIC BLOOD PRESSURE: 78 MMHG | BODY MASS INDEX: 42.82 KG/M2 | HEART RATE: 87 BPM | TEMPERATURE: 98.3 F | SYSTOLIC BLOOD PRESSURE: 140 MMHG | HEIGHT: 60 IN | OXYGEN SATURATION: 96 % | WEIGHT: 218.12 LBS

## 2021-05-16 VITALS
DIASTOLIC BLOOD PRESSURE: 72 MMHG | WEIGHT: 215.25 LBS | SYSTOLIC BLOOD PRESSURE: 124 MMHG | TEMPERATURE: 98.3 F | HEART RATE: 94 BPM | HEIGHT: 60 IN | OXYGEN SATURATION: 98 % | BODY MASS INDEX: 42.26 KG/M2

## 2021-05-28 VITALS
HEART RATE: 92 BPM | BODY MASS INDEX: 39.39 KG/M2 | TEMPERATURE: 97.5 F | SYSTOLIC BLOOD PRESSURE: 135 MMHG | BODY MASS INDEX: 38.58 KG/M2 | RESPIRATION RATE: 16 BRPM | HEART RATE: 75 BPM | OXYGEN SATURATION: 100 % | WEIGHT: 214.07 LBS | DIASTOLIC BLOOD PRESSURE: 87 MMHG | OXYGEN SATURATION: 98 % | HEIGHT: 62 IN | HEIGHT: 62 IN | WEIGHT: 209.66 LBS | DIASTOLIC BLOOD PRESSURE: 81 MMHG | RESPIRATION RATE: 16 BRPM | SYSTOLIC BLOOD PRESSURE: 142 MMHG | TEMPERATURE: 97.6 F

## 2021-05-28 VITALS
HEART RATE: 78 BPM | DIASTOLIC BLOOD PRESSURE: 78 MMHG | BODY MASS INDEX: 43.23 KG/M2 | RESPIRATION RATE: 20 BRPM | TEMPERATURE: 97.4 F | OXYGEN SATURATION: 99 % | SYSTOLIC BLOOD PRESSURE: 134 MMHG | WEIGHT: 221.34 LBS

## 2021-05-28 VITALS
SYSTOLIC BLOOD PRESSURE: 179 MMHG | DIASTOLIC BLOOD PRESSURE: 94 MMHG | DIASTOLIC BLOOD PRESSURE: 78 MMHG | HEIGHT: 62 IN | WEIGHT: 208.56 LBS | HEART RATE: 80 BPM | BODY MASS INDEX: 39.19 KG/M2 | HEART RATE: 77 BPM | OXYGEN SATURATION: 99 % | OXYGEN SATURATION: 97 % | SYSTOLIC BLOOD PRESSURE: 147 MMHG | TEMPERATURE: 97.9 F | BODY MASS INDEX: 38.38 KG/M2 | WEIGHT: 212.96 LBS | TEMPERATURE: 99.3 F | HEIGHT: 62 IN

## 2021-05-28 VITALS
HEIGHT: 62 IN | WEIGHT: 211.64 LBS | HEART RATE: 88 BPM | OXYGEN SATURATION: 100 % | SYSTOLIC BLOOD PRESSURE: 144 MMHG | DIASTOLIC BLOOD PRESSURE: 85 MMHG | TEMPERATURE: 98.7 F | BODY MASS INDEX: 38.95 KG/M2

## 2021-05-28 VITALS
OXYGEN SATURATION: 98 % | SYSTOLIC BLOOD PRESSURE: 158 MMHG | HEART RATE: 80 BPM | WEIGHT: 209 LBS | DIASTOLIC BLOOD PRESSURE: 90 MMHG | HEIGHT: 62 IN | BODY MASS INDEX: 38.46 KG/M2 | TEMPERATURE: 98.3 F

## 2021-05-28 NOTE — PROGRESS NOTES
Patient: TERRI DÍAZ     Acct: [id number]     Report: #[id number]  UNIT #: [id number]     : 1955    Encounter Date:2019  PRIMARY CARE: DEANGELO COOPER  ***Signed***  --------------------------------------------------------------------------------------------------------------------  NURSE INTAKE      Visit Type      Established Patient Visit            Chief Complaint      ANEMIA            Referring Provider/Copies To      Referring Provider:  DEANGELO COOPER      Primary Care Provider:  DEANGELO COOPER            History and Present Illness      Past History      Consult originally seen in 2018 by Dr. KELLIE Villeda      Ms. Díaz  is a very pleasant 63-year-old -American lady who is being     referred to us with symptomatic anemia. According to the patient she has been     anemic all her life and in fact underwent hysterectomy for the same reason 12     years ago. She has previous history of intravenous iron infusion during her     pregnancies many many years ago. She has been evaluated with colonoscopy on 2     occasions and was found to have polyps which were removed. There is no family     history of blood disorder.            Her last colonoscopy was in 2019-1 polyp was removed it was benign.      She is up-to-date with her mammogram having had it done this year which was     negative.      She denies loss of appetite, loss of weight, night sweats, fevers, change in     bowel or bladder habits.            Jessica is here for follow-up of her anemia.  She is transferred care to a new     primary care provider Dr. Hinson.       Currently she offers no specific complaints except for mild fatigue.      I reviewed her laboratory investigations from 2018 and they are as follows:      Iron 55 TIBC 329% saturation 17% transferrin 230 ferritin 207 B12 587 folate 20     [place] creatinine 1.03; total protein/albumin 7.7/3.7-globulin elevated at 4.0      WBC  19.2 hemoglobin 11.4 hematocrit 34.7 platelet count 390,000 MCV 72.9 RBC 4.8    ANC 10.5            2019:      Total protein/albumin 7.9/4.5-normal globulin      Creatinine 0.7            PAST, FAMILY   Past Medical History      Past Medical History:  Arthritis, Diabetes Type 2, High Cholesterol,     Hypertension, Short of Air, Thyroid Disease      Other PMH:        Hypertension, obesity, abnormal ECG, allergic rhinitis, anemia, arthritis,      asthma.      Had stem cells instilled in both knee joints  for arthritis            Past Surgical History      Appendectomy, Cholecystectomy, Hysterectomy            Family History            Mother-MI at age 52      Father- at 76 of old age      Has 1 brother with allergies and asthma; one sister with asthma      Has 3 children and 3 grandchildren who are all healthy.            Social History      Lives independently:  Yes            Tobacco Use      Tobacco status:  Former smoker            Alcohol Use      Alcohol intake:  None            Substance Use      Substance use:  Denies use            REVIEW OF SYSTEMS      General:  Denies: Appetite Change, Fatigue, Fever, Night Sweats, Weight Gain,     Weight Loss      Eye:  Denies: Blurred Vision, Corrective Lenses, Diplopia, Eye Irritation, Eye     Pain, Eye Redness, Spots in Vision, Vision Loss      ENT:  Denies: Headache, Hearing Loss, Hoarseness, Seizures, Sinus Congestion,     Sore Throat      Cardiovascular:  Denies: Chest Pain, Edema Ankles, Edema Legs, Irregular     Heartbeat, Palpitations      Respiratory:  Denies: Coughing Blood, Productive Cough, Shortness of Air,     Wheezing      Gastrointestinal:  Denies: Bloody Stools, Constipation, Diarrhea, Frequent     Heartburn, Nausea, Problem Swallowing, Tarry Stools, Unable to Control Bowels,     Vomiting      Genitourinary (female):  Denies: Blood in Urine, Decrease Urine Stream, Frequent    Urination, Incontinence, Painful Urination      Musculoskeletal:   Denies: Back Pain, Leg Cramps, Muscle Pain, Muscle Weakness,     Painful Joints, Swollen Joints      Integumentary:  Denies: Bleeds Easily, Bruises Easily, Hair Changes, Jaundice,     Lesions, Mole Changes, Nail Changes, Pigment Changes, Rash, Skin Discoloration      Neurologic:  Denies: Dizziness, Fainting, Numbness\Tingling, Paralysis, Seizures      Psychiatric:  Denies: Anxiety, Confused, Depression, Disoriented, Memory Loss      Endocrine:  Denies: Cold Intolerance, Diabetes, Excessive Sweating, Excessive     Thirst, Excessive Urination, Heat Intolerance, Flushing, Hyperthyroidism,     Hypothyroidism      Hematologic/Lymphatic:  Denies: Bruising, Bleeding, Enlarged Lymph Nodes,     Recurrent Infections, Transfusions      Reproductive:  Denies: Menopause, Heavy Periods, Pregnant, Still Menstruating            VITAL SIGNS AND SCORES      Vitals      Height 5 ft 1.85 in / 157.1 cm      Weight 208 lbs 15.937 oz / 94.8 kg      BSA 1.94 m2      BMI 38.4 kg/m2      Temperature 98.3 F / 36.83 C - Temporal      Pulse 80      Blood Pressure 158/90 Sitting, Left Arm      Pulse Oximetry 98%, RM AIR            Pain Score      Experiencing any pain?:  No      Pain Scale Used:  Numerical      Pain Intensity:  0            Fatigue Score      Experiencing any fatigue?:  No      Fatigue (0-10 scale):  0 (none)            EXAM      Other      General appearance: Overweight, in no apparent distress, cooperative, appears     stated age.      HEENT: No pallor, no icterus, oral mucosa moist      Neck: Supple, trachea central-not deviated      Lymph nodes: none palpable peripherally      Cardiovascular: S1-S2 heard, no murmurs, no rubs, no gallops.      Respiratory: Clear to auscultation bilaterally, no adventitious sounds      Abdomen/gastro: Soft, nontender, no palpable hepatosplenomegaly, bowel sounds     heard      Skin: No lesions, no rashes, no petechiae.      Extremities: No pedal edema, peripheral pulses felt, no clubbing             PREVENTION      Hx Influenza Vaccination:  No      Influenza Vaccine Declined:  No      2 or More Falls Past Year?:  No      Fall Past Year with Injury?:  No      Hx Pneumococcal Vaccination:  No      Encouraged to follow-up with:  PCP regarding preventative exams.      Chart initiated by      OSWALD RAMOS [facility]            ALLERGY/MEDS      Allergies      Coded Allergies:             DIPHENHYDRAMINE (Verified  Allergy, Unknown, PT CANT REMEMBER, 7/23/19)           PENICILLINS (Verified  Allergy, Unknown, 7/23/19)           SULFA (SULFONAMIDE ANTIBIOTICS) (Verified  Allergy, Unknown, itch, 7/23/19)            Medications      Last Reconciled on 7/5/18 11:01 by BAHMAN MONTENEGRO MD      Acetaminophen Es (Tylenol Extra Strength) 500 Mg Tablet      1000 MG PO Q4H PRN for JOINT PAIN, #100 TAB 0 Refills         Reported         5/22/19       Montelukast Sodium (Singulair*) 10 Mg Tablet      10 MG PO HS, #30 TAB 0 Refills         Reported         5/22/19       Triamterene/HCTZ (Maxzide 75/50 Mg*) 1 Each Tablet      1 TAB PO QDAY, TAB         Reported         5/22/19       Ferrous Sulfate (Ferrous Sulfate*) 325 Mg Tablet      325 MG PO QDAY, #30 TAB 0 Refills         Reported         5/22/19       Docusate Sodium (Colace) 100 Mg Capsule      100 MG PO BID, #60 CAP 0 Refills         Reported         5/22/19       Tolterodine Tartrate (Tolterodine Tartrate) 2 Mg Tablet      2 MG PO QDAY, TAB         Reported         5/22/19       Topiramate (Topiramate*) 25 Mg Tablet      25 MG PO BID, TAB         Reported         5/22/19       Calcium Carbonate/Vitamin D3 (OYSCO 500-VIT D3 200 TABLET) 1 Each Tablet      1 EACH PO QDAY, TABLET         Reported         5/22/19       Rosuvastatin Calcium (Crestor*) 10 Mg Tablet      10 MG PO QDAY, #30 TAB 0 Refills         Reported         5/22/19       Fexofenadine Hcl (Fexofenadine Hcl) 180 Mg Tablet      180 MG PO QDAY, #30 TAB 0 Refills         Reported         5/22/19       MDI-Advair  250/50 (Advair 250/50 Diskus) 1 Each Blst.w.dev      1 PUFF INH RTBID, #1 INH 0 Refills         Reported         5/22/19       Azithromycin (Zithromax) 250 Mg Tablet      250 MG PO ASDIR, #1 PACKET         Prov: ÓSCAR MICHELE cfr         1/8/19       Oseltamivir Phosphate (Tamiflu*) 75 Mg Capsule      75 MG PO BID for 5 Days, #10 CAP         Prov: ÓSCAR MICHELE cfr         1/7/19       predniSONE* (Deltasone*) 10 Mg Tablet      10 MG PO ASDIR, #18 TAB 0 Refills         Prov: ÓSCAR MICHELE cfr         1/7/19       Levofloxacin (Levaquin*) 500 Mg Tablet      500 MG PO QDAY for 7 Days, #7 TAB 0 Refills         Prov: ÓSCAR MICHELE [facility]         1/7/19       Benzonatate (Tessalon Perles) 100 Mg Cap      100-200 MG PO TID, #40 CAP         Prov: MYRON WALTON cfe         1/2/19       Gabapentin (Gabapentin) 300 Mg Capsule      300 MG PO HS, #30 CAP 0 Refills         Reported         1/2/19       MDI-Ipratropium/Albuterol (Combivent*) 200 Puffs/Inh Inh      1 - 2 PUFF IH QID, AER 0 Refills         Reported         8/3/10       Calcium Carbonate/Vitamin D3 (Calcium 500 + D Tablet) 1 Each Tablet      1 EACH PO BID, 0 Refills         Reported         8/3/10       Neb-Levalbuterol (Xopenex) 1.25 Mg Nebu      1.25 MG NEB PRN, NEB 0 Refills         Reported         8/3/10       Nitroglycerin (Nitroquick*) 0.4 Mg Tab.subl      0.4 MG SL PRN, 0 Refills         Reported         8/3/10       Mometasone Furoate (Nasonex Spray) 17 Gm Naspr      1 SPRAYS NA QDAY, AER 0 Refills         Reported         8/3/10       Triamterene/HCTZ (Maxzide 75/50 Mg*) 1 Tab Tablet      1 TAB PO QDAY, TAB 0 Refills         Reported         6/24/09      Medications Reviewed:  No Changes made to meds            IMPRESSION/PLAN      Diagnosis      Anemia - D64.9      Anemia-with last use parameters looks like chronic.  Will need new laboratory     investigations to assess status.      One value of an elevated globulin in the past-would obtain SPEP/UPEP       Obtain CBC with differential, CMP, LDH, B12, folate, ferritin, iron profile,     thyroid panel at this time.      Follow-up in 1 month      New Diagnostics      * CBC With Auto Diff, Routine      * CMP Comp Metabolic Panel, Routine      * LDH, Routine      * SPEP with Immuno (IEPS), Routine      * IMMUNOFIX PROT ELECTRO URINE, Routine      * Lipid Panel, Routine      * Thyroid Profile, Routine      * Vitamin D Level, Routine      * Ferritin Level, Routine      * Iron Profile, Routine      * B12         Elevated WBC count - D72.829      Unclear etiology and no current labs will need to obtain a peripheral smear.            Hypercholesteremia - E78.00      Obtain a new fasting lipid panel today so that primary care can assess.            Fatigue         Chronic fatigue - R53.82         Fatigue type: chronic, unspecified      Follow-up with primary care.            Patient Education      Patient Education Provided:  Yes            Time Spent Counseling Patient      Total Visit Time:  40      Over 50% Time Counseling Pt:  Yes                 Disclaimer: Converted document may not contain table formatting or lab diagrams. Please see [employer] for the authenticated document.

## 2021-05-28 NOTE — PROGRESS NOTES
Patient: TERRI HOLBROOK     Acct: [id number]     Report: #[id number]  UNIT #: [id number]     : 1955    Encounter Date:2018  PRIMARY CARE: DEANGELO COOPER  ***Signed***  --------------------------------------------------------------------------------------------------------------------  NURSE INTAKE      Encounter Date      2018            Providers      Provider Not Found in Lookup:  DR. BERMUDEZ            Visit Type      New Patient Visit            Chief Complaint      ANEMIA            Allergies      Coded Allergies:             DIPHENHYDRAMINE (Verified  Allergy, Unknown, PT CANT REMEMBER, 18)           PENICILLINS (Verified  Allergy, Unknown, 18)           SULFA (SULFONAMIDE ANTIBIOTICS) (Verified  Allergy, Unknown, itch, 18)      Uncoded Allergies:             Penicillin (Allergy, Unknown, 3/25/06)           Sulfa (Allergy, Unknown, 3/25/06)            Medications      Last Reconciled on 18 11:01 by BAHMAN MONTENEGRO MD      Azithromycin (Zithromax) 1 Gm Packet      1 TAB PO UD, #6 TAB 0 Refills         Prov: TONI BRUSH         11       Prednisone (predniSONE*) 50 Mg Tab      1 TAB PO QDAY, #5 TAB 0 Refills         Prov: TONI BRUSH         11       Doxycycline Hyclate (Doxycycline Hyclate*) 100 Mg Capsule      1 CAP PO BID, #14 MG 0 Refills         Prov: MANOLO GARAZ         12/5/10       Prednisone (predniSONE*) 50 Mg Tab      1 TAB PO QDAY, #5 TAB 0 Refills         Prov: MANOLO GARZA         12/5/10       Med Rec Complete (Med Rec Complete)  Comment      1 EA PO 12/5/10 csa         Reported         12/5/10       Sulfamethoxazole/Trimethoprim (Septra 400/80 MG*) 1 Tab Tablet      2 TAB PO DAILY         Reported         12/5/10       Prednisone (predniSONE*) 10 Mg Tab      1 TAB PO QDAY, TAB         Reported         12/5/10       Omalizumab (Xolair*) 5 Mg/0.04 Ml Vial      150 MG SUBQ Q14DAY, ML         Reported         12/5/10        ALPRAZolam (Xanax) 0.25 Mg Tablet      1 TAB PO BID Y, TAB 0 Refills         Reported         8/7/10       Fluticasone/Salmeterol (Advair 250/50 Diskus*) 28 Puff/Inh Inh      1 PUFF INH BID, 0 Refills         Reported         8/7/10       Montelukast Sodium (Singulair*) 10 Mg Tab      1 TAB PO QDAY, TAB 0 Refills         Reported         8/3/10       Esomeprazole Mag (NexIUM) 40 Mg Capsule      1 CAP PO QDAY, CAP 0 Refills         Reported         8/3/10       MDI-Ipratropium/Albuterol (Combivent*) 200 Puffs/Inh Inh      1 - 2 PUFF IH QID, AER 0 Refills         Reported         8/3/10       Inulin (Metamucil) 197.2 Gm Powder      1 TSP PO QHS, 0 Refills         Reported         8/3/10       Multivitamin (Multivitamin Tab) 1 Tab Tab      1 TAB PO QDAY, TAB 0 Refills         Reported         8/3/10       Calcium Carbonate/Vitamin D3 (Calcium 500 + D Tablet) 1 Each Tablet      1 EACH PO BID, 0 Refills         Reported         8/3/10       Neb-Levalbuterol (Xopenex) 1.25 Mg Nebu      1.25 MG NEB PRN, NEB 0 Refills         Reported         8/3/10       Nitroglycerin (Nitroquick*) 0.4 Mg Tab.subl      0.4 MG SL PRN, 0 Refills         Reported         8/3/10       Cetirizine Hcl (ZyrTEC*) 10 Mg Tablet      1 TAB PO QDAY, 0 Refills         Reported         8/3/10       Mometasone Furoate (Nasonex Spray) 17 Gm Naspr      1 SPRAYS NA QDAY, AER 0 Refills         Reported         8/3/10       Metoprolol Succinate (Toprol XL*) 50 Mg Tabcr      1 TAB PO DAILY, TAB 0 Refills         Reported         8/3/10       Triamterene/HCTZ (Maxzide 75/50 Mg*) 1 Tab Tablet      1 TAB PO QDAY, TAB 0 Refills         Reported         6/24/09      Medications Reviewed:  No Changes made to meds            PAST, FAMILY   Past Medical History      Past Medical History:  Heart Attack, Low or High WBC Count, Arthritis      Hematology/oncology:  REPORTS HX OF: Anemia            Past Surgical History      REPORTS HX OF: Cholecystectomy, Hysterectomy             Family History      DENIES HX OF: Anemia            Social History      Lives independently:  Yes            Tobacco Use      Tobacco status:  Former smoker      Counseling given:  Patient declined            Alcohol Use      Alcohol intake:  None      Counseling given:  None            Substance Use      Substance use:  Denies use            HISTORY OF PRESENT ILLNESS      History and Physical            Ms. Díaz  is a very pleasant 63-year-old lady who is being referred to us     with symptomatic anemia. According to the patient she has been anemic all her     life and in fact underwent hysterectomy for the same reason 12 years ago. She     has previous history of intravenous iron infusion during her pregnancies many     many years ago. She has been evaluated with colonoscopy on 2 occasions and was     found to have polyps which were removed. There is no family history of blood     disorder.            REVIEW OF SYSTEMS      General:  Denies: Appetite change, Excessive sweating, Fatigue, Fever, Night     sweats, Weight gain, Weight loss, Other      Eyes:  Denies: Blurred vision, Corrective lenses, Diplopia, Eye irritation, Eye     pain, Eye redness, Spots in vision, Vision loss, Other      Ears, nose, mouth, throat:  Denies: Headache, Seizures, Visual Changes, Hearing     loss, Sinus Congestion, Hoarseness, Sore throat, Other      Cardiovascular:  Denies: Chest pain, Irregular heartbeat, Palpitations, Swollen     ankles/legs, Other      Respiratory:  Complains of: Productive cough, Denies: Chest pain, Shortness of     Air, Coughing blood, Other      Gastrointestinal:  Denies: Nausea, Vomiting, Problem swallowing, Frequent     heartburn, Constipation, Diarrhea, Tarry stools, Bloody stools, Unable to     control bowels, Other      Kidney/Bladder:  Denies: Painful Urination, Change in urinary stream, Blood in     urine, Incontinence, Frequent Urination, Decreased urine stream, Other      Musculoskeletal:   Denies: New Back pain, Leg Cramps, Painful Joints, Swollen     Joints, Muscle Pain, Muscle weakness, Other      Skin:  DENIES: Jaundice, Easy Bleeding, Lesions/changes in moles, Nail changes,     Skin Discoloration, Rash, Other      Neurological:  Denies: Dizziness, Fainting, Numbness\Tingling, Paralysis,     Seizures, Other      Psychiatric:  Complains of: AAO X 3, Denies: Anxiety, Panic attacks, Depression    , Memory loss, Other      Endocrine:  DiabetesThyroid DisorderOsteoporosisEndocrine Other      Hematologic/lymphatic:  Denies: Bruising, Bleeding, Enlarged Lymph Nodes,     Recurrent infections, Other      Reproductive:  Denies Pregnant, Denies Menopause, Denies Still Menstruating,     Denies Heavy Periods, Denies Other            VITAL SIGNS AND SCORES      Vitals      Height 5 ft 1.85 in / 157.1 cm      Weight 209 lbs 10.519 oz / 95.1 kg      BSA 2.09 m2      BMI 38.5 kg/m2      Temperature 97.5 F / 36.39 C - Temporal      Pulse 75      Respirations 16      Blood Pressure 135/81 Sitting, Left Arm      Pulse Oximetry 100%, RM  AIR            Pain Score      Pain Assessment:           Experiencing any pain?:  No         Pain Scale Used:  Numerical         Pain Intensity:  0            Fatigue Score               Experiencing any fatigue?:  No         Fatigue (0-10 scale):  0 (none)            EXAM      General Appearance:  Alert, Oriented X3, Cooperative      Eyes:  Anicteric Sclerae, Moist Conjunctiva      HEENT:  Orophraynx clear, No Erythema, No Exudates      Neck:  Supple, Full ROM      Respiratory:  CTAB, No Diminished Breath      Abdomen\Gastro:  Soft, No NABS      Cardio:  RRR, No Murmur, No, Peripheral Edema      Skin:  Normal Temperature, No Induration      Psychiatric:  Appropriate Affect, Intact Judgement, AAO x3      Muscularskeletal:  Normal Gait and Station, Full ROM of extremeties      Lymphatic:  No Cervical, No Supraclavicular, No Infraclavicular, No Axillary,     No Inguinal, No Other             PREVENTION      Hx Influenza Vaccination:  Yes (2009)      2 or More Falls Past Year?:  No      Fall Past Year with Injury?:  No      Hx Pneumococcal Vaccination:  Yes (2010)      Encouraged to follow-up with:  PCP regarding preventative exams.      Chart initiated by      YOLANDE JACOB MA            IMPRESSION/PLAN      Current Plan      Ms. Díaz is being referred to us with symptomatic anemia which is most     likely due to iron deficiency. I suspect patient is unable to absorb oral iron.     We'll complete the lab work and most likely patient is going to need     intravenous iron infusion.            Plan      Iron deficiency anemia - D50.9            Notes      New Diagnostics      * CBC, Stat       Dx: Iron deficiency anemia - D50.9      * Comp Metabolic Panel, Stat      * B12   * LDH, Stat      * Reticulocyte Count, Stat      * Iron Profile, Stat      * Haptoglobin, Stat      * Ferritin Level, Stat            Patient Education      Patient Education Provided:  Yes                 Disclaimer: Converted document may not contain table formatting or lab diagrams. Please see [employer] for the authenticated document.

## 2021-05-28 NOTE — PROGRESS NOTES
Patient: TERRI HOLBROOK     Acct: [id number]     Report: #[id number]  UNIT #: [id number]     : 1955    Encounter Date:2018  PRIMARY CARE: DEANGELO COOPRE  ***Signed***  --------------------------------------------------------------------------------------------------------------------  NURSE INTAKE      Encounter Date      2018            Providers      Provider Not Found in Lookup:  DR. BERMUDEZ            Chief Complaint      ANEMIA            Allergies      Coded Allergies:             DIPHENHYDRAMINE (Verified  Allergy, Unknown, PT CANT REMEMBER, 18)           PENICILLINS (Verified  Allergy, Unknown, 18)           SULFA (SULFONAMIDE ANTIBIOTICS) (Verified  Allergy, Unknown, itch, 18)      Uncoded Allergies:             Penicillin (Allergy, Unknown, 3/25/06)           Sulfa (Allergy, Unknown, 3/25/06)            Medications      Last Reconciled on 18 11:01 by BAHMAN MONTENEGRO MD      Azithromycin (Zithromax) 1 Gm Packet      1 TAB PO UD, #6 TAB 0 Refills         Prov: TONI BRUSH         11       Prednisone (predniSONE*) 50 Mg Tab      1 TAB PO QDAY, #5 TAB 0 Refills         Prov: TONI BRUSH         11       Doxycycline Hyclate (Doxycycline Hyclate*) 100 Mg Capsule      1 CAP PO BID, #14 MG 0 Refills         Prov: MANOLO GARZA         12/5/10       Prednisone (predniSONE*) 50 Mg Tab      1 TAB PO QDAY, #5 TAB 0 Refills         Prov: MANOLO GARZA         12/5/10       Med Rec Complete (Med Rec Complete)  Comment      1 EA PO 12/5/10 csa         Reported         12/5/10       Sulfamethoxazole/Trimethoprim (Septra 400/80 MG*) 1 Tab Tablet      2 TAB PO DAILY         Reported         12/5/10       Prednisone (predniSONE*) 10 Mg Tab      1 TAB PO QDAY, TAB         Reported         12/5/10       Omalizumab (Xolair*) 5 Mg/0.04 Ml Vial      150 MG SUBQ Q14DAY, ML         Reported         12/5/10       ALPRAZolam (Xanax) 0.25 Mg Tablet      1 TAB PO  BID Y, TAB 0 Refills         Reported         8/7/10       Fluticasone/Salmeterol (Advair 250/50 Diskus*) 28 Puff/Inh Inh      1 PUFF INH BID, 0 Refills         Reported         8/7/10       Montelukast Sodium (Singulair*) 10 Mg Tab      1 TAB PO QDAY, TAB 0 Refills         Reported         8/3/10       Esomeprazole Mag (NexIUM) 40 Mg Capsule      1 CAP PO QDAY, CAP 0 Refills         Reported         8/3/10       MDI-Ipratropium/Albuterol (Combivent*) 200 Puffs/Inh Inh      1 - 2 PUFF IH QID, AER 0 Refills         Reported         8/3/10       Inulin (Metamucil) 197.2 Gm Powder      1 TSP PO QHS, 0 Refills         Reported         8/3/10       Multivitamin (Multivitamin Tab) 1 Tab Tab      1 TAB PO QDAY, TAB 0 Refills         Reported         8/3/10       Calcium Carbonate/Vitamin D3 (Calcium 500 + D Tablet) 1 Each Tablet      1 EACH PO BID, 0 Refills         Reported         8/3/10       Neb-Levalbuterol (Xopenex) 1.25 Mg Nebu      1.25 MG NEB PRN, NEB 0 Refills         Reported         8/3/10       Nitroglycerin (Nitroquick*) 0.4 Mg Tab.subl      0.4 MG SL PRN, 0 Refills         Reported         8/3/10       Cetirizine Hcl (ZyrTEC*) 10 Mg Tablet      1 TAB PO QDAY, 0 Refills         Reported         8/3/10       Mometasone Furoate (Nasonex Spray) 17 Gm Naspr      1 SPRAYS NA QDAY, AER 0 Refills         Reported         8/3/10       Metoprolol Succinate (Toprol XL*) 50 Mg Tabcr      1 TAB PO DAILY, TAB 0 Refills         Reported         8/3/10       Triamterene/HCTZ (Maxzide 75/50 Mg*) 1 Tab Tablet      1 TAB PO QDAY, TAB 0 Refills         Reported         6/24/09      Medications Reviewed:  No Changes made to meds            PAST, FAMILY   Past Medical History      Past Medical History:  Heart Attack, Low or High WBC Count, Arthritis      Hematology/oncology:  REPORTS HX OF: Anemia            Past Surgical History      REPORTS HX OF: Cholecystectomy, Hysterectomy            Family History      DENIES HX OF: Anemia             Social History      Lives independently:  Yes            Tobacco Use      Tobacco status:  Former smoker      Counseling given:  Patient declined            Alcohol Use      Alcohol intake:  None      Counseling given:  None            Substance Use      Substance use:  Denies use            HISTORY OF PRESENT ILLNESS      Interim History      Patient presents for follow up for anemia. She denies any complaints weakness,     fatigue, shortness of breath.            History and Physical            Ms. Díaz  is a very pleasant 63-year-old lady who is being referred to us     with symptomatic anemia. According to the patient she has been anemic all her     life and in fact underwent hysterectomy for the same reason 12 years ago. She     has previous history of intravenous iron infusion during her pregnancies many     many years ago. She has been evaluated with colonoscopy on 2 occasions and was     found to have polyps which were removed. There is no family history of blood     disorder.            Most Recent Lab Findings      Laboratory Tests      7/5/18 11:04            Laboratory Tests            Test        7/5/18      11:04             Ferritin        207 ng/mL      ()            REVIEW OF SYSTEMS      General:  Denies: Appetite change, Excessive sweating, Fatigue, Fever, Night     sweats, Weight gain, Weight loss, Other      Eyes:  Denies: Blurred vision, Corrective lenses, Diplopia, Eye irritation, Eye     pain, Eye redness, Spots in vision, Vision loss, Other      Ears, nose, mouth, throat:  Denies: Headache, Seizures, Visual Changes, Hearing     loss, Sinus Congestion, Hoarseness, Sore throat, Other      Cardiovascular:  Denies: Chest pain, Irregular heartbeat, Palpitations, Swollen     ankles/legs, Other      Respiratory:  Denies: Chest pain, Shortness of Air, Productive cough, Coughing     blood, Other      Gastrointestinal:  Denies: Nausea, Vomiting, Problem swallowing, Frequent      heartburn, Constipation, Diarrhea, Tarry stools, Bloody stools, Unable to     control bowels, Other      Kidney/Bladder:  Denies: Painful Urination, Change in urinary stream, Blood in     urine, Incontinence, Frequent Urination, Decreased urine stream, Other      Musculoskeletal:  Denies: New Back pain, Leg Cramps, Painful Joints, Swollen     Joints, Muscle Pain, Muscle weakness, Other      Skin:  DENIES: Jaundice, Easy Bleeding, Lesions/changes in moles, Nail changes,     Skin Discoloration, Rash, Other      Neurological:  Denies: Dizziness, Fainting, Numbness\Tingling, Paralysis,     Seizures, Other      Psychiatric:  Complains of: AAO X 3, Denies: Anxiety, Panic attacks, Depression    , Memory loss, Other      Endocrine:  DiabetesThyroid DisorderOsteoporosisEndocrine Other      Hematologic/lymphatic:  Denies: Bruising, Bleeding, Enlarged Lymph Nodes,     Recurrent infections, Other      Reproductive:  Denies Pregnant, Denies Menopause, Denies Still Menstruating,     Denies Heavy Periods, Denies Other            VITAL SIGNS AND SCORES      Vitals      Height 5 ft 1.85 in / 157.1 cm      Weight 214 lbs 1.067 oz / 97.1 kg      BSA 2.11 m2      BMI 39.3 kg/m2      Temperature 97.6 F / 36.44 C - Temporal      Pulse 92      Respirations 16      Blood Pressure 142/87 Sitting, Left Arm      Pulse Oximetry 98%, RM AIR            Pain Score      Pain Assessment:           Experiencing any pain?:  No         Pain Scale Used:  Numerical         Pain Intensity:  0            Fatigue Score               Experiencing any fatigue?:  No         Fatigue (0-10 scale):  0 (none)            EXAM      Vitals            Vital Signs              Date Time  Temp Pulse Resp B/P (MAP) Pulse Ox O2 Delivery O2 Flow Rate FiO2             7/5/18 10:46 97.5 75 16 135/81 100 RM  AIR              General Appearance:  Alert, Oriented X3, Cooperative, No acute distress      Eyes:  Anicteric Sclerae, Moist Conjunctiva      HEENT:  Orophraynx  clear, No Erythema, No Exudates      Neck:  Supple, Full ROM      Respiratory:  CTAB, No Diminished Breath      Abdomen\Gastro:  Soft      Cardio:  RRR, No Murmur, No, Peripheral Edema      Skin:  Normal Temperature, Normal Tone, Normal Texture and Turgor      Psychiatric:  Appropriate Affect, Intact Judgement, AAO x3      Muscularskeletal:  Normal Gait and Station      Lymphatic:  No Cervical, No Supraclavicular, No Infraclavicular, No Axillary,     No Inguinal, No Other            PREVENTION      Hx Influenza Vaccination:  Yes (2009)      2 or More Falls Past Year?:  No      Fall Past Year with Injury?:  No      Hx Pneumococcal Vaccination:  Yes (2010)      Encouraged to follow-up with:  PCP regarding preventative exams.      Chart initiated by      YOLANDE JACOB MA            IMPRESSION/PLAN      Current Plan      Current Plan 7/12/18:      Here for follow up for anemia.  She voices no complaints of fatigue, weakness.      Reviewed labs from 7/5: Hgb 11.4.  WBC 19.2, but patient reports she has been     on Prednisone for a back injury.  Also noted potassium level 2.9.  Patient     reports she takes potassium 1 tablet daily. Instructed her to increase to 2     tablets daily.  We will follow up in 6 months to recheck her labs for anemia.     Patient voices understanding.            Patient Education      Patient Education Provided:  Yes                 Disclaimer: Converted document may not contain table formatting or lab diagrams. Please see [employer] for the authenticated document.

## 2021-05-28 NOTE — PROGRESS NOTES
Patient: TERRI DÍAZ     Acct: [id number]     Report: #[id number]  UNIT #: [id number]     : 1955    Encounter Date:2020  PRIMARY CARE: DEANGELO COOPER  ***Signed***  --------------------------------------------------------------------------------------------------------------------  TELEHEALTH NOTE      Past Oncology Illness History      Ms. Díaz is a very pleasant 65-year-old -American lady who is here     for follow-up of anemia. According to the patient she has been anemic all her     life and in fact underwent hysterectomy for the same reason 12 years ago. She     has previous history of intravenous iron infusion during her pregnancies. Her     last colonoscopy was in 2019-1 polyp was removed it was benign.  She has had     several polyps removed in the past.               2019: Continued mild microcytic anemia, hemoglobin electrophoresis normal.            Patient was treated with IV iron in 2020.            Left knee replacement on 2020            History of Present Illness            Chief Complaint:  ANEMIA             Terri Díaz is presenting for evaluation via Telehealth visit by phone.    Verbal consent obtained before beginning visit.            Provider spent (12) minutes with the patient during telehealth visit.            The following staff were present during the visit: (Will Shi, RN)                         Overview of Symptoms      I contacted the patient today regarding her recent labs on 9/15/2020.  Her WBC     count remains elevated at 13.85, hemoglobin 9.3, platelet count 412.  Her     absolute neutrophil count is also elevated at 9300 and her ESR is 48.  I again     reviewed the history of her labs which show that her white blood cell count has     been elevated since .  She is also been anemic with a hemoglobin of 10 going    back as far as .  Patient reports no significant changes except she did have     a left knee replacement on July 24.  She feels remarkably better and is very     happy to have full function of the knee back.  On Thursday of next week she will    meet with a surgeon again to discuss the right knee replacement.  She says that     her knees are frequently swollen with significant inflammation.  This is been a     major life limiting issue.            Most Recent Lab Findings      Laboratory Tests      9/15/20 15:21            Allergies/Medications      Allergies:        Coded Allergies:             DIPHENHYDRAMINE (Verified  Allergy, Unknown, PT CANT REMEMBER, 6/9/20)           PENICILLINS (Verified  Allergy, Unknown, 6/9/20)           SULFA (SULFONAMIDE ANTIBIOTICS) (Verified  Allergy, Unknown, itch, 6/9/20)      Medications    Last Reconciled on 9/18/20 17:29 by GARETH CARRASCO      Potassium Chloride (K-Dur*) 10 Meq Tab.er.prt      10 MEQ PO QDAY, #30 TAB 0 Refills         Reported         11/20/19       Neb-Budesonide (Pulmicort) 0.25 Mg/2 Ml Ampul.neb      0.25 MG INH RTBID for 30 Days, #60 NEB         Prov: HARRIS MYERS cfshree         7/30/19       Tolterodine Tartrate (Detrol) Unknown Strength Tablet      PO BID, TAB         Reported         7/30/19       Rosuvastatin Calcium (Crestor*) Unknown Strength Tablet      PO HS, #15 TAB 0 Refills         Reported         7/30/19       Docusate Sodium (Colace) Unknown Strength Capsule      PO BID, #60 CAP 0 Refills         Reported         7/30/19       Ferrous Sulfate (Iron Sulfate*) Unknown Strength Tablet      PO BID, #60 TAB 0 Refills         Reported         7/30/19       Multivitamins (Multi-Vitamin) Unknown Strength Tablet      PO QDAY, #30 TAB 0 Refills         Reported         7/30/19       MDI-Ipratropium/Albuterol (Combivent*) 200 Puffs/Inh Inh      1 - 2 PUFF IH QID, AER 0 Refills         Reported         8/3/10       Calcium Carbonate/Vitamin D3 (Calcium 500 + D Tablet) 1 Each Tablet      1 EACH PO BID, 0 Refills         Reported          8/3/10       Neb-Levalbuterol (Xopenex) 1.25 Mg Nebu      1.25 MG NEB PRN, NEB 0 Refills         Reported         8/3/10       Nitroglycerin (Nitroquick*) 0.4 Mg Tab.subl      0.4 MG SL PRN, 0 Refills         Reported         8/3/10       Mometasone Furoate (Nasonex Spray) 17 Gm Naspr      1 SPRAYS NA QDAY, AER 0 Refills         Reported         8/3/10       Triamterene/HCTZ (Maxzide 75/50 Mg*) 1 Tab Tablet      1 TAB PO QDAY, TAB 0 Refills         Reported         6/24/09            Plan/Instructions      Ambulatory Assessment/Plan:        Anemia - D64.9            JO (iron deficiency anemia) - D50.9            Abnormal CBC - R79.89            Notes      New Diagnostics      * CBC With Auto Diff, 2 Week         Dx: Anemia - D64.9      * Ferritin Level, 2 Week         Dx: Anemia - D64.9      * Iron Profile, 2 Week         Dx: Anemia - D64.9      * Free Light Chains Ka, 2 Week         Dx: Anemia - D64.9      * HEMOGLOBIN ELECTROPHORESIS, 2 Week         Dx: Anemia - D64.9      * Haptoglobin, 2 Week         Dx: Anemia - D64.9      * SPEP with Immuno (IEPS), 2 Week         Dx: Anemia - D64.9      * IMMUNOFIX PROT ELECTRO URINE, 2 Week         Dx: Anemia - D64.9      * CMP Comp Metabolic Panel, 2 Week         Dx: Anemia - D64.9      * JOHNNIE Screen/Reflex Hep Titer, 2 Week         Dx: Anemia - D64.9      * LDH, 2 Week         Dx: Anemia - D64.9      * Rheumatoid Factor IGM, 2 Week         Dx: Anemia - D64.9      * Jak2 V617f and Jak2 Exon 12-14, 2 Week         Dx: Anemia - D64.9      Plan/Instructions            * Plan Of Care: (Leukocytosis and microcytic anemia)      * Leukocytosis: The etiology of the patient's elevated white blood cell count is      not entirely known but I suspect it is related to her severe arthritis with       chronic inflammation.      * Microcytic anemia: The etiology of this is also unclear at this time.  My       concern is that it appears her hemoglobin has dropped further from her        baseline of just above 10.  However this may not represent a significant drop       but her reaction following surgery.  I suspect that her anemia is a result of       a combination of factors including chronic inflammation, likely thalassemia       trait, and possibly some degree of iron deficiency.  In 2 to 3 weeks I will       have the patient get labs including a repeat CBC, CMP, iron studies with       ferritin, SPEP, serum free light chains, hemoglobin electrophoresis, haptog      lobin, LDH, Neftali 2, rheumatoid factor, and JOHNNIE.  The patient will follow-up       with me the following week to discuss the results.  I would advocate that she       proceed to her second knee surgery since this is an enormous factor in her       quality of life.  If her hemoglobin does not improve after the knee surgery       and no other etiology is clearly defined then I will recommend the patient       have a bone marrow biopsy.  We briefly discussed this today and I reassured       the patient that she would receive sedative medication prior to the procedure       and it would not be as painful as she perceives.            * Chronic conditions reviewed and taken into consideration for today's treatment      plan.      * Patient instructed to seek medical attention urgently for new or worsening       symptoms.      * Patient was educated/instructed on their diagnosis, treatment and medications       prior to discharge from the clinic today.      Codes:  Phone Eval 11-20 mi [id number]            Electronically signed by GARETH CARRASCO  09/18/2020 17:29       Disclaimer: Converted document may not contain table formatting or lab diagrams. Please see [employer] [facility] for the authenticated document.

## 2021-05-28 NOTE — PROGRESS NOTES
Patient: JOSEPHINE DÍAZ     Acct: [id number]     Report: #[id number]  UNIT #: [id number]     : 1955    Encounter Date:2020  PRIMARY CARE: DEANGELO COOPER  ***Signed***  --------------------------------------------------------------------------------------------------------------------  TELEHEALTH NOTE      History of Present Illness            Chief Complaint: (ANEMIA)            Josephine Díaz is presenting for evaluation via Telehealth visit by phone.     Verbal consent obtained before beginning visit.            Provider spent (16) minutes with the patient during telehealth visit.            The following staff were present during the visit: (Will Shi, MICHAEL)                         Past Med History      Ms. Díaz is a very pleasant 64-year-old -American lady who is here     for follow-up of anemia. According to the patient she has been anemic all her     life and in fact underwent hysterectomy for the same reason 12 years ago. She     has previous history of intravenous iron infusion during her pregnancies. Her     last colonoscopy was in 2019-1 polyp was removed it was benign.  She has had     several polyps removed in the past.               2019: Continued mild microcytic anemia, hemoglobin electrophoresis normal.            Patient was treated with IV iron in 2020.      Overview of Symptoms      I contacted the patient today to review her most recent labs on 2020.  Her     WBC count is mildly elevated at 13.3, hemoglobin 11.0, MCV 79.5, platelet count     389, absolute granulocyte count 8840.  The rest of differential is normal.  ESR     is elevated at 36.  Ferritin is slightly improved from 1682 down to 1178.            Patient complains of arthritis in her knee.  She says she is having knee     replacement on 2020.  She says she has been seeing the orthopedic surgeon     since 2016 for the same issue.  Her mom also had severe  arthritis in her knees.     She does not have any known history of rheumatologic conditions in her family.      Her dad  at the age of 52 from an MI.  She also has a bit of a cough with     mucus but denies acute illness.            Most Recent Lab Findings      Laboratory Tests      20 10:52            Laboratory Tests            Test       20      10:45             Ferritin       1178 ng/mL      ()            Allergies/Medications      Allergies:        Coded Allergies:             DIPHENHYDRAMINE (Verified  Allergy, Unknown, PT CANT REMEMBER, 20)           PENICILLINS (Verified  Allergy, Unknown, 20)           SULFA (SULFONAMIDE ANTIBIOTICS) (Verified  Allergy, Unknown, itch, 20)      Medications    Last Reconciled on 20 21:18 by GARETH CARRASCO      Potassium Chloride (K-Dur*) 10 Meq Tab.er.prt      10 MEQ PO QDAY, #30 TAB 0 Refills         Reported         19       Neb-Budesonide (Pulmicort) 0.25 Mg/2 Ml Ampul.neb      0.25 MG INH RTBID for 30 Days, #60 NEB         Prov: HARRIS MYRES cfe         19       Tolterodine Tartrate (Detrol) Unknown Strength Tablet      PO BID, TAB         Reported         19       Rosuvastatin Calcium (Crestor*) Unknown Strength Tablet      PO HS, #15 TAB 0 Refills         Reported         19       Docusate Sodium (Colace) Unknown Strength Capsule      PO BID, #60 CAP 0 Refills         Reported         19       Ferrous Sulfate (Iron Sulfate*) Unknown Strength Tablet      PO BID, #60 TAB 0 Refills         Reported         19       Multivitamins (Multi-Vitamin) Unknown Strength Tablet      PO QDAY, #30 TAB 0 Refills         Reported         19       MDI-Ipratropium/Albuterol (Combivent*) 200 Puffs/Inh Inh      1 - 2 PUFF IH QID, AER 0 Refills         Reported         8/3/10       Calcium Carbonate/Vitamin D3 (Calcium 500 + D Tablet) 1 Each Tablet      1 EACH PO BID, 0 Refills         Reported         8/3/10        Neb-Levalbuterol (Xopenex) 1.25 Mg Nebu      1.25 MG NEB PRN, NEB 0 Refills         Reported         8/3/10       Nitroglycerin (Nitroquick*) 0.4 Mg Tab.subl      0.4 MG SL PRN, 0 Refills         Reported         8/3/10       Mometasone Furoate (Nasonex Spray) 17 Gm Naspr      1 SPRAYS NA QDAY, AER 0 Refills         Reported         8/3/10       Triamterene/HCTZ (Maxzide 75/50 Mg*) 1 Tab Tablet      1 TAB PO QDAY, TAB 0 Refills         Reported         6/24/09            Plan/Instructions      Ambulatory Assessment/Plan:        Abnormal CBC - R79.89            Anemia - D64.9            Notes      New Diagnostics      * CBC With Auto Diff, 3 Months         Dx: Abnormal CBC - R79.89      * CMP Comp Metabolic Panel, 3 Months         Dx: Abnormal CBC - R79.89      * Sed Rate, 3 Months         Dx: Abnormal CBC - R79.89      Plan/Instructions            * Plan Of Care: (Microcytic anemia)      * Despite adequate iron replacement the patient continues to have microcytic       anemia.  I strongly suspect the patient's microcytic anemia is secondary to       thalassemia trait which should have no significant effect on the patient's       health.  Flow cytometry to rule out hematologic malignancy is still pending.        Patient will follow-up with me in 3 months with a repeat CBC with diff      erential, CMP, and ESR.      * Patient does have a very elevated ferritin although her transferrin saturation      is within normal limits.  This suggests underlying inflammation which could be      consistent with rheumatologic condition.      * Upcoming surgery: Patient has requested we fax a form to her surgeon to say       that she is cleared for the operation from a hematology standpoint which I       will do.            * Chronic conditions reviewed and taken into consideration for today's treatment      plan.      * Patient instructed to seek medical attention urgently for new or worsening       symptoms.      * Patient was  educated/instructed on their diagnosis, treatment and medications       prior to discharge from the clinic today.      Codes:  Phone Eval 11-20 mi [id number]            Electronically signed by GARETH CARRASCO  06/17/2020 21:18       Disclaimer: Converted document may not contain table formatting or lab diagrams. Please see [employer] for the authenticated document.

## 2021-05-28 NOTE — PROGRESS NOTES
Patient: JOSEPHINE DÍAZ     Acct: [id number]     Report: #[id number]  UNIT #: [id number]     : 1955    Encounter Date:2020  PRIMARY CARE: DEANGELO COOPER  ***Signed***  --------------------------------------------------------------------------------------------------------------------  TELEHEALTH NOTE      Past Oncology Illness History      Ms. Díaz is a very pleasant 65-year-old -American lady who is here     for follow-up of anemia. According to the patient she has been anemic all her     life and in fact underwent hysterectomy for the same reason 12 years ago. She     has previous history of intravenous iron infusion during her pregnancies. Her     last colonoscopy was in 2019-1 polyp was removed it was benign.  She has had     several polyps removed in the past.               2019: Continued mild microcytic anemia, hemoglobin electrophoresis normal.            Patient was treated with IV iron in 2020.            Left knee replacement on 2020            History of Present Illness            Chief Complaint: (ANEMIA)            Josephine Díaz is presenting for evaluation via Telehealth visit by phone.     Verbal consent obtained before beginning visit.            Provider spent (12) minutes with the patient during telehealth visit.            The following staff were present during the visit: (Will Shi, MICHAEL)                         Past Med History      Dyslipidemia, hypertension, anemia, reactive airway disease.      Overview of Symptoms      Patient presents for follow-up of her anemia.  She has multiple medical problems    inclusive of arthritis and anemia and hypertension and cholesterol issues.  On     2020 patient underwent left knee surgery and was planning on having right     knee surgery but has postponed it due to familial issues at present.  Her     arthritic knee pain continues.  Otherwise she offers no specific complaints.             Patient denies chills, fevers, night sweats, rigors, loss of appetite, loss of     weight, change in bowel/bladder habits.  Denies shortness of breath, chest     discomfort, palpitations.            Reviewed her laboratory investigations and they are as follows: 10/6/2020:      Iron 44, TIBC 249% saturation 18%, transferrin 174, ferritin 1119, SPEP and     immunofixation negative, JOHNNIE equivocal, rheumatoid factor negative, normal LDH     and haptoglobin, Neftali 42956M mutation negative; JAK2 exon 12, 14 mutation     negative      WBC 12.  21 hemoglobin 9.4 hematocrit 30.9 platelet count 459,000 MCV 75 RBC     4.12      In the past her hemoglobin electrophoresis has been normal.            Allergies/Medications      Allergies:        Coded Allergies:             DIPHENHYDRAMINE (Verified  Allergy, Unknown, PT CANT REMEMBER, 11/20/20)           PENICILLINS (Verified  Allergy, Unknown, 11/20/20)           SULFA (SULFONAMIDE ANTIBIOTICS) (Verified  Allergy, Unknown, itch,     11/20/20)      Medications    Last Reconciled on 9/18/20 17:29 by GARETH CARRASCO      Potassium Chloride (K-Dur*) 10 Meq Tab.er.prt      10 MEQ PO QDAY, #30 TAB 0 Refills         Reported         11/20/19       Neb-Budesonide (Pulmicort) 0.25 Mg/2 Ml Ampul.neb      0.25 MG INH RTBID for 30 Days, #60 NEB         Prov: HARRIS MYERS cfe         7/30/19       Tolterodine Tartrate (Detrol) Unknown Strength Tablet      PO BID, TAB         Reported         7/30/19       Rosuvastatin Calcium (Crestor*) Unknown Strength Tablet      PO HS, #15 TAB 0 Refills         Reported         7/30/19       Docusate Sodium (Colace) Unknown Strength Capsule      PO BID, #60 CAP 0 Refills         Reported         7/30/19       Ferrous Sulfate (Iron Sulfate*) Unknown Strength Tablet      PO BID, #60 TAB 0 Refills         Reported         7/30/19       Multivitamins (Multi-Vitamin) Unknown Strength Tablet      PO QDAY, #30 TAB 0 Refills         Reported          7/30/19       MDI-Ipratropium/Albuterol (Combivent*) 200 Puffs/Inh Inh      1 - 2 PUFF IH QID, AER 0 Refills         Reported         8/3/10       Calcium Carbonate/Vitamin D3 (Calcium 500 + D Tablet) 1 Each Tablet      1 EACH PO BID, 0 Refills         Reported         8/3/10       Neb-Levalbuterol (Xopenex) 1.25 Mg Nebu      1.25 MG NEB PRN, NEB 0 Refills         Reported         8/3/10       Nitroglycerin (Nitroquick*) 0.4 Mg Tab.subl      0.4 MG SL PRN, 0 Refills         Reported         8/3/10       Mometasone Furoate (Nasonex Spray) 17 Gm Naspr      1 SPRAYS NA QDAY, AER 0 Refills         Reported         8/3/10       Triamterene/HCTZ (Maxzide 75/50 Mg*) 1 Tab Tablet      1 TAB PO QDAY, TAB 0 Refills         Reported         6/24/09            Plan/Instructions      Ambulatory Assessment/Plan:        Notes      From all laboratory investigations, it appears that this patient suffers from     anemia of chronic disease secondary to underlying inflammatory condition i.e.     arthritis and multiple other medical issues.      With that said, I do not have a solid reason for her MCV to be low other than     possibly explaining it by an underlying alpha thalassemia trait which is a     diagnosis of exclusion and is a probable cause considering her hemoglobin     electrophoresis was normal.  This patient is not to receive any further iron     supplementation and this has been explained to her in length today.      Should she require going for knee surgery and should there be a need to     transfuse at the time, that would not be unreasonable.      For now hematologically there is no further intervention at this time.  Patient     may follow-up with Dr. Benjamin in 6 months for a quick check and if there is     nothing further to be done at that time, patient may be followed up by her     primary care provider.      Plan/Instructions            * Plan Of Care: ()            * Chronic conditions reviewed and taken into  consideration for today's treatment       plan.      * Patient instructed to seek medical attention urgently for new or worsening       symptoms.      * Patient was educated/instructed on their diagnosis, treatment and medications       prior to discharge from the clinic today.      Codes:  Phone Eval 11-20 mi [id number]            Electronically signed by LURDES MCMILLAN  11/20/2020 17:30       Disclaimer: Converted document may not contain table formatting or lab diagrams. Please see [employer] [facility] for the authenticated document.

## 2021-05-28 NOTE — PROGRESS NOTES
Patient: TERRI DÍAZ     Acct: [id number]     Report: #[id number]  UNIT #: [id number]     : 1955    Encounter Date:2019  PRIMARY CARE: DEANGELO COOPER  ***Signed***  --------------------------------------------------------------------------------------------------------------------  NURSE INTAKE      Visit Type      Established Patient Visit            Chief Complaint      ANEMIA            Referring Provider/Copies To      Referring Provider:  DEANGELO COOPER      Primary Care Provider:  DEANGELO COOPER            History and Present Illness      Past History      Consult originally seen in 2018 by Dr. KELLIE Villeda      Ms. Díaz  is a very pleasant 63-year-old -American lady who is being     referred to us with symptomatic anemia. According to the patient she has been     anemic all her life and in fact underwent hysterectomy for the same reason 12     years ago. She has previous history of intravenous iron infusion during her     pregnancies many many years ago. She has been evaluated with colonoscopy on 2     occasions and was found to have polyps which were removed. There is no family     history of blood disorder.            Her last colonoscopy was in 2019-1 polyp was removed it was benign.      She is up-to-date with her mammogram having had it done this year which was     negative.      She denies loss of appetite, loss of weight, night sweats, fevers, change in     bowel or bladder habits.            Jessica is here for follow-up of her anemia.  She is transferred care to a new     primary care provider Dr. Hinson.       Currently she offers no specific complaints except for mild fatigue.      I reviewed her laboratory investigations from 2018 and they are as follows:      Iron 55 TIBC 329% saturation 17% transferrin 230 ferritin 207 B12 587 folate 20     Christiana creatinine 1.03; total protein/albumin 7.7/3.7-globulin elevated at 4.0      WBC  19.2 hemoglobin 11.4 hematocrit 34.7 platelet count 390,000 MCV 72.9 RBC 4.8    ANC 10.5            2019:      Total protein/albumin 7.9/4.5-normal globulin      Creatinine 0.7            2019: Continued mild microcytic anemia, hemoglobin electrophoresis normal            HPI - Hematology Interim      Ms. Díaz has some mild fatigue still but no other new symptoms.  She did     have a recent respiratory infection.  She has not noticed any symptoms of GI     blood loss.  She is not taking any iron pills at present.            Most Recent Lab Findings      2019 hemoglobin 10.1 with MCV 75.9 and platelets mildly elevated at 413     with white blood count mildly elevated at 12.47.  Reticulocytes were 1.57% and     hemoglobin electrophoresis was normal      Stool for occult blood was negative            PAST, FAMILY   Past Medical History      Past Medical History:  Arthritis, Diabetes Type 2, High Cholesterol,     Hypertension, Short of Air, Thyroid Disease      Other PMH:        Hypertension, obesity, abnormal ECG, allergic rhinitis, anemia, arthritis,      asthma.      Had stem cells instilled in both knee joints  for arthritis      Hematology/Oncology (F):  Anemia            Past Surgical History      Appendectomy, Cholecystectomy, Hysterectomy            Family History            Mother-MI at age 52      Father- at 76 of old age      Has 1 brother with allergies and asthma; one sister with asthma      Has 3 children and 3 grandchildren who are all healthy.            Social History      Lives independently:  Yes            Tobacco Use      Tobacco status:  Former smoker            Alcohol Use      Alcohol intake:  None            Substance Use      Substance use:  Denies use            REVIEW OF SYSTEMS      General:  Admits: Fatigue;          Denies: Appetite Change, Fever, Night Sweats, Weight Gain, Weight Loss      Eye:  Denies Blurred Vision, Denies Corrective Lenses, Denies Diplopia,  Denies     Vision Changes      ENT:  Denies Headache, Denies Hearing Loss, Denies Hoarseness, Denies Sore     Throat      Cardiovascular:  Denies Chest Pain, Denies Palpitations      Respiratory:  Denies: Coughing Blood, Productive Cough, Shortness of Air,     Wheezing      Gastrointestinal:  Denies Bloody Stools, Denies Constipation, Denies Diarrhea,     Denies Nausea/Vomiting, Denies Problem Swallowing, Denies Unable to Control     Bowels      Genitourinary:  Denies Blood in Urine, Denies Incontinence, Denies Painful     Urination      Musculoskeletal:  Admits Back Pain; Denies Muscle Pain, Denies Painful Joints      Integumentary:  Denies Itching, Denies Lesions, Denies Rash      Neurologic:  Denies Dizziness, Denies Numbness\Tingling, Denies Seizures      Psychiatric:  Denies Anxiety, Denies Depression      Endocrine:  Denies Cold Intolerance, Denies Heat Intolerance      Hematologic/Lymphatic:  Denies Bruising, Denies Bleeding, Denies Enlarged Lymph     Nodes      Reproductive:  Denies: Menopause, Heavy Periods, Pregnant, Still Menstruating            VITAL SIGNS AND SCORES      Vitals      Height 5 ft 1.85 in / 157.1 cm      Weight 211 lbs 10.266 oz / 96.0 kg      BSA 1.95 m2      BMI 38.9 kg/m2      Temperature 98.7 F / 37.06 C - Temporal      Pulse 88      Blood Pressure 144/85 Sitting, Left Arm      Pulse Oximetry 100%, RM AIR            Pain Score      Experiencing any pain?:  Yes      Pain Scale Used:  Numerical      Pain Intensity:  3            Fatigue Score      Experiencing any fatigue?:  Yes      Fatigue (0-10 scale):  5            EXAM      General Appearance:  Positive for: Alert, Obese;          Negative for: Acute Distress      Eye:  Positive for: Reactive to Light, Size      HEENT:  Positive for: Oropharynx clear;          Negative for: Exudates, Pallor, Thrush      Neck:  Positive for: Supple;          Negative for: Masses      Respiratory:  Positive for: CTAB, Normal Respiratory Effort;           Negative for: Crackles      Abdomen/Gastro:  Positive for: Soft;          Negative for: Hepatosplenomegaly, Tenderness      Cardiovascular:  Positive for: RRR;          Negative for: Gallop, Murmur      Skin:  Negative for: Lesions, Rash, Ulcers      Lower Extremities:  Positive for: Normal Gait and Station;          Negative for: Deformities, Edema      Upper Extremities:  Negative for: Clubbing, Digital Cyanosis, Digital Ischemia      Lymphatic:  Negative for: Axillary, Cervical, Supraclavicular            PREVENTION      Hx Influenza Vaccination:  No      Influenza Vaccine Declined:  No      2 or More Falls Past Year?:  No      Fall Past Year with Injury?:  No      Hx Pneumococcal Vaccination:  No      Encouraged to follow-up with:  PCP regarding preventative exams.      Chart initiated by      OSWALD ZAMBRANO CMA            ALLERGY/MEDS      Allergies      Coded Allergies:             DIPHENHYDRAMINE (Verified  Allergy, Unknown, PT CANT REMEMBER, 9/18/19)           PENICILLINS (Verified  Allergy, Unknown, 9/18/19)           SULFA (SULFONAMIDE ANTIBIOTICS) (Verified  Allergy, Unknown, itch, 9/18/19)            Medications      Last Reconciled on 7/5/18 11:01 by BAHMAN MONTENEGRO MD      Azithromycin (Azithromycin) 250 Mg Tablet      250 MG PO ASDIR, #6 TAB         Prov: HARRIS MYERS cfe         7/30/19       Benzonatate (Benzonatate) 200 Mg Capsule      200 MG PO Q8H PRN for COUGH, #21 CAP         Prov: HARRIS MYERS cfe         7/30/19       Neb-Budesonide (Pulmicort) 0.25 Mg/2 Ml Ampul.neb      0.25 MG INH RTBID for 30 Days, #60 NEB         Prov: HARRIS MYERS cfe         7/30/19       Tolterodine Tartrate (Detrol) Unknown Strength Tablet      PO BID, TAB         Reported         7/30/19       Rosuvastatin Calcium (Crestor*) Unknown Strength Tablet      PO HS, #15 TAB 0 Refills         Reported         7/30/19       Docusate Sodium (Colace) Unknown Strength Capsule      PO BID, #60 CAP 0 Refills          Reported         7/30/19       Ferrous Sulfate (Iron Sulfate*) Unknown Strength Tablet      PO BID, #60 TAB 0 Refills         Reported         7/30/19       Multivitamins (Multi-Vitamin) Unknown Strength Tablet      PO QDAY, #30 TAB 0 Refills         Reported         7/30/19       MDI-Ipratropium/Albuterol (Combivent*) 200 Puffs/Inh Inh      1 - 2 PUFF IH QID, AER 0 Refills         Reported         8/3/10       Calcium Carbonate/Vitamin D3 (Calcium 500 + D Tablet) 1 Each Tablet      1 EACH PO BID, 0 Refills         Reported         8/3/10       Neb-Levalbuterol (Xopenex) 1.25 Mg Nebu      1.25 MG NEB PRN, NEB 0 Refills         Reported         8/3/10       Nitroglycerin (Nitroquick*) 0.4 Mg Tab.subl      0.4 MG SL PRN, 0 Refills         Reported         8/3/10       Mometasone Furoate (Nasonex Spray) 17 Gm Naspr      1 SPRAYS NA QDAY, AER 0 Refills         Reported         8/3/10       Triamterene/HCTZ (Maxzide 75/50 Mg*) 1 Tab Tablet      1 TAB PO QDAY, TAB 0 Refills         Reported         6/24/09      Medications Reviewed:  No Changes made to meds            IMPRESSION/PLAN      Impression      Microcytic anemia.  Her studies are not clearly diagnostic.  She did have a low     iron saturation although her ferritin was elevated.  This may be an atypical or     partially treated iron deficiency anemia.  Thalassemia trait cannot be     completely excluded based on the normal hemoglobin electrophoresis.            Diagnosis      JO (iron deficiency anemia) - D50.9            Abnormal CBC - R79.89            Notes      New Diagnostics      * CBC With Auto Diff, 2 Months         Dx: JO (iron deficiency anemia) - D50.9      * Iron Profile, 2 Months         Dx: JO (iron deficiency anemia) - D50.9      * Ferritin Level, 2 Months         Dx: JO (iron deficiency anemia) - D50.9            Plan      I will check her response to oral iron.  She reports having ferrous sulfate at     home and I have asked her to resume  this.  I will see her back in November to     check her response.  If she does not improve with oral iron we can consider     giving her parenteral iron.            Patient Education      Patient Education Provided:  Yes                 Disclaimer: Converted document may not contain table formatting or lab diagrams. Please see [employer] [facility] for the authenticated document.

## 2021-05-28 NOTE — PROGRESS NOTES
Patient: TERRI DÍAZ     Acct: [id number]     Report: #[id number]  UNIT #: [id number]     : 1955    Encounter Date:2019  PRIMARY CARE: DEANGELO COOPER  ***Signed***  --------------------------------------------------------------------------------------------------------------------  NURSE INTAKE      Visit Type      Established Patient Visit            Chief Complaint      anemia            Referring Provider/Copies To      Referring Provider:  DEANGELO COOPER      Primary Care Provider:  DEANGELO COOPER            History and Present Illness      Past History      Consult originally seen in 2018 by Dr. KELLIE Villeda      Ms. Díaz  is a very pleasant 63-year-old -American lady who is being     referred to us with symptomatic anemia. According to the patient she has been     anemic all her life and in fact underwent hysterectomy for the same reason 12     years ago. She has previous history of intravenous iron infusion during her     pregnancies many many years ago. She has been evaluated with colonoscopy on 2     occasions and was found to have polyps which were removed. There is no family     history of blood disorder.            Her last colonoscopy was in 2019-1 polyp was removed it was benign.      She is up-to-date with her mammogram having had it done this year which was     negative.      She denies loss of appetite, loss of weight, night sweats, fevers, change in     bowel or bladder habits.            Jessica is here for follow-up of her anemia.  She is transferred care to a new     primary care provider Dr. Hinson.       Currently she offers no specific complaints except for mild fatigue.      I reviewed her laboratory investigations from 2018 and they are as follows:      Iron 55 TIBC 329% saturation 17% transferrin 230 ferritin 207 B12 587 folate 20     [place] creatinine 1.03; total protein/albumin 7.7/3.7-globulin elevated at 4.0      WBC  19.2 hemoglobin 11.4 hematocrit 34.7 platelet count 390,000 MCV 72.9 RBC 4.8    ANC 10.5            2019:      Total protein/albumin 7.9/4.5-normal globulin      Creatinine 0.7            2019: Continued mild microcytic anemia, hemoglobin electrophoresis normal            HPI - Hematology Interim      Ms. Fatima is seen for her chronic mild microcytic anemia.  She reports that     she has been taking her ferrous sulfate regularly without difficulty.  She has     not noticed any symptoms of GI blood loss.  She is been having some sciatica     recently and she was seen at her primary care office earlier she did not have     any labs drawn in the last several weeks.  She does not report any symptoms of     anemia except fatigue.  In particular, no orthostatic dizziness or palpitations.            Most Recent Lab Findings      2019 normal hemoglobin electrophoresis            PAST, FAMILY   Past Medical History      Past Medical History:  Arthritis, Diabetes Type 2, High Cholesterol,     Hypertension, Short of Air, Thyroid Disease      Other PMH:        Hypertension, obesity, abnormal ECG, allergic rhinitis, anemia, arthritis,      asthma.      Had stem cells instilled in both knee joints  for arthritis      Hematology/Oncology (F):  Anemia            Past Surgical History      Appendectomy, Cholecystectomy, Hysterectomy            Family History            Mother-MI at age 52      Father- at 76 of old age      Has 1 brother with allergies and asthma; one sister with asthma      Has 3 children and 3 grandchildren who are all healthy.            Social History      Lives independently:  Yes            Tobacco Use      Tobacco status:  Former smoker            Alcohol Use      Alcohol intake:  None            Substance Use      Substance use:  Denies use            REVIEW OF SYSTEMS      General:  Denies: Appetite Change, Fatigue, Fever, Night Sweats, Weight Gain,     Weight Loss      Eye:  Denies  Blurred Vision, Denies Corrective Lenses, Denies Diplopia, Denies     Vision Changes      ENT:  Denies Headache, Denies Hearing Loss, Denies Hoarseness, Denies Sore Th    roat      Cardiovascular:  Denies Chest Pain, Denies Palpitations      Respiratory:  Denies: Coughing Blood, Productive Cough, Shortness of Air,     Wheezing      Gastrointestinal:  Denies Bloody Stools, Denies Constipation, Denies Diarrhea,     Denies Nausea/Vomiting, Denies Problem Swallowing, Denies Unable to Control     Bowels      Genitourinary:  Denies Blood in Urine, Denies Incontinence, Denies Painful     Urination      Musculoskeletal:  Denies Back Pain; Admits Muscle Pain; Denies Painful Joints      Integumentary:  Denies Itching, Denies Lesions, Denies Rash      Neurologic:  Denies Dizziness, Denies Numbness\Tingling, Denies Seizures      Psychiatric:  Denies Anxiety, Denies Depression      Endocrine:  Denies Cold Intolerance, Denies Heat Intolerance      Hematologic/Lymphatic:  Denies Bruising, Denies Bleeding, Denies Enlarged Lymph     Nodes      Reproductive:  Denies: Menopause, Heavy Periods, Pregnant, Still Menstruating            VITAL SIGNS AND SCORES      Vitals      Height 5 ft 1.85 in / 157.1 cm      Weight 212 lbs 15.430 oz / 96.6 kg      BSA 1.96 m2      BMI 39.1 kg/m2      Temperature 97.9 F / 36.61 C - Temporal      Pulse 80      Blood Pressure 147/78 Sitting, Left Arm      Pulse Oximetry 99%, ROOM AIR            Pain Score      Experiencing any pain?:  No      Pain Scale Used:  Numerical      Pain Intensity:  0            Fatigue Score      Experiencing any fatigue?:  No      Fatigue (0-10 scale):  0 (none)            EXAM      General Appearance:  Positive for: Alert;          Negative for: Acute Distress      Eye:  Positive for: Anicteric Sclerae, Reactive to Light, Size      HEENT:  Positive for: Oropharynx clear;          Negative for: Erythema, Pallor      Neck:  Positive for: Supple;          Negative for: Masses       Respiratory:  Positive for: CTAB, Normal Respiratory Effort;          Negative for: Crackles      Abdomen/Gastro:  Positive for: Soft;          Negative for: Distention, Hepatosplenomegaly, Tenderness      Cardiovascular:  Positive for: RRR;          Negative for: Gallop, Murmur      Skin:  Positive for: Normal Tone;          Negative for: Lesions, Rash      Musculoskeletal:  Positive for: Full ROM Upper Extremety, Full Muscle Strength,     Full Muscle Tone      Lower Extremities:  Negative for: Deformities, Edema, Normal Gait and Station     (She is using a cane because of her sciatica)      Upper Extremities:  Negative for: Clubbing, Digital Cyanosis      Lymphatic:  Negative for: Axillary, Cervical, Supraclavicular            PREVENTION      Hx Influenza Vaccination:  No      Influenza Vaccine Declined:  No      2 or More Falls Past Year?:  No      Fall Past Year with Injury?:  No      Hx Pneumococcal Vaccination:  No      Encouraged to follow-up with:  PCP regarding preventative exams.      Chart initiated by      juan carlos parmar [facility]            ALLERGY/MEDS      Allergies      Coded Allergies:             DIPHENHYDRAMINE (Verified  Allergy, Unknown, PT CANT REMEMBER, 11/20/19)           PENICILLINS (Verified  Allergy, Unknown, 11/20/19)           SULFA (SULFONAMIDE ANTIBIOTICS) (Verified  Allergy, Unknown, itch,     11/20/19)            Medications      Last Reconciled on 7/5/18 11:01 by BAHMAN MONTENEGRO MD      Potassium Chloride (K-Dur*) 10 Meq Tab.er.prt      10 MEQ PO QDAY, #30 TAB 0 Refills         Reported         11/20/19       Neb-Budesonide (Pulmicort) 0.25 Mg/2 Ml Ampul.neb      0.25 MG INH RTBID for 30 Days, #60 NEB         Prov: HARRIS MYERS cfe         7/30/19       Tolterodine Tartrate (Detrol) Unknown Strength Tablet      PO BID, TAB         Reported         7/30/19       Rosuvastatin Calcium (Crestor*) Unknown Strength Tablet      PO HS, #15 TAB 0 Refills         Reported         7/30/19        Docusate Sodium (Colace) Unknown Strength Capsule      PO BID, #60 CAP 0 Refills         Reported         7/30/19       Ferrous Sulfate (Iron Sulfate*) Unknown Strength Tablet      PO BID, #60 TAB 0 Refills         Reported         7/30/19       Multivitamins (Multi-Vitamin) Unknown Strength Tablet      PO QDAY, #30 TAB 0 Refills         Reported         7/30/19       MDI-Ipratropium/Albuterol (Combivent*) 200 Puffs/Inh Inh      1 - 2 PUFF IH QID, AER 0 Refills         Reported         8/3/10       Calcium Carbonate/Vitamin D3 (Calcium 500 + D Tablet) 1 Each Tablet      1 EACH PO BID, 0 Refills         Reported         8/3/10       Neb-Levalbuterol (Xopenex) 1.25 Mg Nebu      1.25 MG NEB PRN, NEB 0 Refills         Reported         8/3/10       Nitroglycerin (Nitroquick*) 0.4 Mg Tab.subl      0.4 MG SL PRN, 0 Refills         Reported         8/3/10       Mometasone Furoate (Nasonex Spray) 17 Gm Naspr      1 SPRAYS NA QDAY, AER 0 Refills         Reported         8/3/10       Triamterene/HCTZ (Maxzide 75/50 Mg*) 1 Tab Tablet      1 TAB PO QDAY, TAB 0 Refills         Reported         6/24/09      Medications Reviewed:  Changes made to meds            IMPRESSION/PLAN      Impression      Anemia that I think is iron deficiency anemia which is not responding well to o    ral iron.  Unless her hemoglobin and MCV have returned to normal, I would favor     a trial of parenteral iron to document her response to iron and to confirm the     diagnosis.  Also this might help with her fatigue            Diagnosis      Notes      New Medications      * Potassium Chloride (K-Dur*) 10 MEQ TAB.ER.PRT: 10 MEQ PO QDAY #30      Discontinued Medications      * Benzonatate 200 MG CAPSULE: 200 MG PO Q8H PRN COUGH #21      * Azithromycin 250 MG TABLET: 250 MG PO ASDIR #6         Instructions: Take 500 mg (two tablets) on the first day, then 250 mg (one        tablet) once a day until all gone.            Plan      1.  We will check her CBC  and iron studies today.  Unless her CBC has returned     to normal or is significantly improved I would plan to give her a dose of     parenteral iron.  She will be contacted by this clinic and she will follow-up     here in 3 months if her counts have returned to normal and sooner if requires     treatment with parenteral iron.            Patient Education      Patient Education Provided:  Yes            Electronically signed by VEL OROZCO  11/20/2019 14:06       Disclaimer: Converted document may not contain table formatting or lab diagrams. Please see [employer] [facility] for the authenticated document.

## 2021-05-28 NOTE — PROGRESS NOTES
Patient: TERRI DÍAZ     Acct: [id number]     Report: #[id number]  UNIT #: [id number]     : 1955    Encounter Date:2020  PRIMARY CARE: DEANGELO COOPER  ***Signed***  --------------------------------------------------------------------------------------------------------------------  NURSE INTAKE      Visit Type      Established Patient Visit            Chief Complaint      ANEMIA            Referring Provider/Copies To      Referring Provider:  DEANGELO COOPER      Primary Care Provider:  DEANGELO COOPER      Copies To:   DEANGELO COOPER            History and Present Illness      Past History      Ms. Díaz is a very pleasant 64-year-old -American lady who is here     for follow-up of anemia. According to the patient she has been anemic all her     life and in fact underwent hysterectomy for the same reason 12 years ago. She     has previous history of intravenous iron infusion during her pregnancies.             Her last colonoscopy was in 2019-1 polyp was removed it was benign.  She has had    several polyps removed in the past.              2019: Continued mild microcytic anemia, hemoglobin electrophoresis normal.            HPI - Hematology Interim      Patient comes in today for follow-up for anemia.  She is been off oral iron.      Her hemoglobin decreased from 10.6-10.1.  Transferrin saturation 17% on     0.  Ferritin 181 back in November but not checked recently.  The patient reports    some fatigue but overall feels fairly well.  She is actually scheduled for a     heart cath tomorrow secondary to recent chest pain.            PAST, FAMILY   Past Medical History      Past Medical History:  Arthritis, Diabetes Type 2, High Cholesterol, Hypertens    ion, Short of Air, Thyroid Disease      Other PMH:        Hypertension, obesity, abnormal ECG, allergic rhinitis, anemia, arthritis,      asthma.      Had stem cells instilled in  both knee joints 2019 for arthritis      Hematology/Oncology (F):  Anemia            Past Surgical History      Appendectomy, Cholecystectomy, Hysterectomy            Family History            Mother-MI at age 52      Father- at 76 of old age      Has 1 brother with allergies and asthma; one sister with asthma      Has 3 children and 3 grandchildren who are all healthy.            Social History      Lives independently:  Yes            Tobacco Use      Tobacco status:  Former smoker            Alcohol Use      Alcohol intake:  None            Substance Use      Substance use:  Denies use            REVIEW OF SYSTEMS      General:  Admits: Fatigue;          Denies: Appetite Change, Fever, Night Sweats, Weight Gain, Weight Loss      Eye:  Denies Blurred Vision, Denies Corrective Lenses, Denies Diplopia, Denies     Vision Changes      ENT:  Denies Headache, Denies Hearing Loss, Denies Hoarseness, Denies Sore     Throat      Cardiovascular:  Admits Chest Pain; Denies Palpitations      Respiratory:  Denies: Coughing Blood, Productive Cough, Shortness of Air,     Wheezing      Gastrointestinal:  Denies Bloody Stools, Denies Constipation, Denies Diarrhea,     Denies Nausea/Vomiting, Denies Problem Swallowing, Denies Unable to Control     Bowels      Genitourinary:  Denies Blood in Urine, Denies Incontinence, Denies Painful     Urination      Musculoskeletal:  Denies Back Pain, Denies Muscle Pain; Admits Painful Joints      Integumentary:  Denies Itching, Denies Lesions, Denies Rash      Neurologic:  Denies Dizziness, Denies Numbness\Tingling, Denies Seizures      Psychiatric:  Denies Anxiety, Denies Depression      Endocrine:  Denies Cold Intolerance, Denies Heat Intolerance      Hematologic/Lymphatic:  Denies Bruising, Denies Bleeding, Denies Enlarged Lymph     Nodes      Reproductive:  Denies: Menopause, Heavy Periods, Pregnant, Still Menstruating            VITAL SIGNS AND SCORES      Vitals      Weight 221 lbs  5.469 oz / 100.4 kg      Temperature 97.4 F / 36.33 C - Temporal      Pulse 78      Respirations 20      Blood Pressure 134/78 Sitting, Left Arm      Pulse Oximetry 99%, ROOM AIR            Pain Score      Experiencing any pain?:  Yes      Pain Scale Used:  Numerical      Pain Intensity:  10            Fatigue Score      Experiencing any fatigue?:  No      Fatigue (0-10 scale):  0 (none)            EXAM      General: Alert, cooperative, no acute distress      Eyes: Anicteric sclera, PERRLA      HEENT: Oropharynx clear, no exudates      Respiratory: CTAB, normal respiratory effort      Abdomen: Normal active bowel sounds, no tenderness, no distention      Cardiovascular: RRR, no murmur, no peripheral edema      Skin: Normal tone, no rash, no lesions      Psychiatric: Appropriate affect, intact judgment      Neurologic: No focal sensory or motor deficits, no weakness, numbness, dizziness      Musculoskeletal: Normal muscle strength, normal muscle tone      Extremities: No clubbing, cyanosis, or deformities            PREVENTION      Hx Influenza Vaccination:  No      Influenza Vaccine Declined:  No      2 or More Falls Past Year?:  No      Fall Past Year with Injury?:  No      Hx Pneumococcal Vaccination:  No      Encouraged to follow-up with:  PCP regarding preventative exams.      Chart initiated by      BISHNU JACK [facility]            ALLERGY/MEDS      Allergies      Coded Allergies:             DIPHENHYDRAMINE (Verified  Allergy, Unknown, PT CANT REMEMBER, 2/20/20)           PENICILLINS (Verified  Allergy, Unknown, 2/20/20)           SULFA (SULFONAMIDE ANTIBIOTICS) (Verified  Allergy, Unknown, itch, 2/20/20)            Medications      Last Reconciled on 2/25/20 23:10 by GARETH CARRASCO      Potassium Chloride (K-Dur*) 10 Meq Tab.er.prt      10 MEQ PO QDAY, #30 TAB 0 Refills         Reported         11/20/19       Neb-Budesonide (Pulmicort) 0.25 Mg/2 Ml Ampul.neb      0.25 MG INH RTBID for 30 Days, #60 NEB          Prov: HARRIS MYERS cfe         7/30/19       Tolterodine Tartrate (Detrol) Unknown Strength Tablet      PO BID, TAB         Reported         7/30/19       Rosuvastatin Calcium (Crestor*) Unknown Strength Tablet      PO HS, #15 TAB 0 Refills         Reported         7/30/19       Docusate Sodium (Colace) Unknown Strength Capsule      PO BID, #60 CAP 0 Refills         Reported         7/30/19       Ferrous Sulfate (Iron Sulfate*) Unknown Strength Tablet      PO BID, #60 TAB 0 Refills         Reported         7/30/19       Multivitamins (Multi-Vitamin) Unknown Strength Tablet      PO QDAY, #30 TAB 0 Refills         Reported         7/30/19       MDI-Ipratropium/Albuterol (Combivent*) 200 Puffs/Inh Inh      1 - 2 PUFF IH QID, AER 0 Refills         Reported         8/3/10       Calcium Carbonate/Vitamin D3 (Calcium 500 + D Tablet) 1 Each Tablet      1 EACH PO BID, 0 Refills         Reported         8/3/10       Neb-Levalbuterol (Xopenex) 1.25 Mg Nebu      1.25 MG NEB PRN, NEB 0 Refills         Reported         8/3/10       Nitroglycerin (Nitroquick*) 0.4 Mg Tab.subl      0.4 MG SL PRN, 0 Refills         Reported         8/3/10       Mometasone Furoate (Nasonex Spray) 17 Gm Naspr      1 SPRAYS NA QDAY, AER 0 Refills         Reported         8/3/10       Triamterene/HCTZ (Maxzide 75/50 Mg*) 1 Tab Tablet      1 TAB PO QDAY, TAB 0 Refills         Reported         6/24/09      Medications Reviewed:  No Changes made to meds            IMPRESSION/PLAN      Impression      64-year-old female here for follow-up for anemia            Diagnosis      Anemia - D64.9            JO (iron deficiency anemia) - D50.9            Notes      New Diagnostics      * CBC With Auto Diff, Routine         Dx: Anemia - D64.9      * Ferritin Level, Routine         Dx: Anemia - D64.9      * Iron Profile, Routine         Dx: Anemia - D64.9      * CBC With Auto Diff, 6 WEEKS         Dx: Anemia - D64.9      * Iron Profile, 6 WEEKS         Dx:  Anemia - D64.9      * Ferritin Level, 6 WEEKS         Dx: Anemia - D64.9            Plan      Anemia: Patient recently had iron deficiency anemia in the past.  Despite iron     replacement her MCV has remained low.  It is likely she has not had adequate     iron replacement but microcytosis could also be complicated by an undiagnosed     alpha thalassemia trait.  For thalassemia carrier trait is difficult to diagnose    and does not have any significant impact on the patient's overall health.  It     would not change the treatment but would be reassuring as to the explanation for    her persistent microcytosis.  I will recheck CBC with differential and iron     studies today and plan for IV iron in the coming weeks.  She will follow-up in     about 2 months with repeat CBC with differential and iron studies.            Patient Education      Patient Education Provided:  Yes            Electronically signed by GARETH CARRASCO  02/25/2020 23:10       Disclaimer: Converted document may not contain table formatting or lab diagrams. Please see [facility] for the authenticated document.

## 2021-05-28 NOTE — PROGRESS NOTES
Patient: TERRI DÍAZ     Acct: [id number]     Report: #[id number]  UNIT #: [id number]     : 1955    Encounter Date:2020  PRIMARY CARE: DEANGELO COOPER  ***Signed***  --------------------------------------------------------------------------------------------------------------------  TELEHEALTH NOTE      History of Present Illness            Chief Complaint:Anemia             Terri Díaz is presenting for evaluation via Telehealth visit. Verbal     consent obtained before beginning visit.            Provider spent (12) minutes with the patient during telehealth visit.            The following staff were present during the visit: (none)                         Past Med History      Ms. Díaz is a very pleasant 64-year-old -American lady who is here     for follow-up of anemia. According to the patient she has been anemic all her     life and in fact underwent hysterectomy for the same reason 12 years ago. She     has previous history of intravenous iron infusion during her pregnancies. Her     last colonoscopy was in 2019-1 polyp was removed it was benign.  She has had     several polyps removed in the past.              She has a history of asthma and smoke only rare from age 20 to 32.              2019: Continued mild microcytic anemia, hemoglobin electrophoresis normal.            Patient was treated with IV iron in 2020.      Overview of Symptoms      I contacted patient today discussed the results of her recent blood work.  She     had IV iron in 2020.  She reports no new significant or concerning     symptoms.  She did recently have a stress test for chest pain but that was all     negative.  She has a history of having a heart attack in  and follows with     Dr. Luis E Simpson.  Occasionally she has some shortness of breath related to asthma     but no significant changes in that.  On review of her labs again it is notable     that she  has had leukocytosis since 2006 and anemia since 2003 and likely before     that.            Most Recent Lab Findings      Laboratory Tests      4/7/20 09:36            Laboratory Tests            Test       4/7/20      09:30             Ferritin       1682 ng/mL      ()            Plan/Instructions      Ambulatory Assessment/Plan:        Anemia - D64.9            Elevated white blood cell count - D72.829            Notes      New Diagnostics      * CBC With Auto Diff, 2 Months         Dx: Anemia - D64.9      * Iron Profile, 2 Months         Dx: Anemia - D64.9      * Ferritin Level, 2 Months         Dx: Anemia - D64.9      * Sed Rate, 2 Months         Dx: Anemia - D64.9      * Flow Cytometry Leukemia Lymph, 2 Months         Dx: Anemia - D64.9      Plan/Instructions            * Plan Of Care: (Leukocytosis and anemia)      * Patient was previously diagnosed with iron deficiency anemia and after IV iron      transfusions now has very high ferritin and normal transferrin saturation.        However, she still has evidence of microcytic anemia with a hemoglobin of       10.9.  This is likely secondary to an undiagnosed thalassemia trait.  I have       inquired about how to send this genetic test so the patient can have a       diagnosis.  Since this test is not done often it is cumbersome and possibly       expensive.  It will also not change the management since thalassemia trait       does not require any treatment.  For that reason I decided not to pursue the       genetic test.  I am more concerned that the patient has a persistently       elevated white blood cell count and has no obvious explanation such as chronic      inflammation or history of smoking.  For this reason I will plan to send flow       cytometry, ESR, as well as repeat CBC with differential and iron studies and       have her follow-up in 2 months.            * Chronic conditions reviewed and taken into consideration for today's treatment       plan.      * Patient instructed to seek medical attention urgently for new or worsening       symptoms.      * Patient was educated/instructed on their diagnosis, treatment and medications       prior to discharge from the clinic today.      Codes:  Phone Eval 11-20 mi [id number]            Electronically signed by GARETH CARRASCO  04/17/2020 15:27       Disclaimer: Converted document may not contain table formatting or lab diagrams. Please see [employer] [facility] for the authenticated document.

## 2021-05-28 NOTE — PROGRESS NOTES
Patient: TERRI DÍAZ     Acct: [id number]     Report: #[id number]  UNIT #: [id number]     : 1955    Encounter Date:2019  PRIMARY CARE: DEANGELO COOPER  ***Signed***  --------------------------------------------------------------------------------------------------------------------  NURSE INTAKE      Visit Type      Established Patient Visit            Chief Complaint      ANEMIA            Referring Provider/Copies To      Referring Provider:  DEANGELO COOPER      Primary Care Provider:  DEANGELO COOPER            History and Present Illness      Past History      Consult originally seen in 2018 by Dr. KELLIE Villeda      Ms. Díaz  is a very pleasant 63-year-old -American lady who is being     referred to us with symptomatic anemia. According to the patient she has been     anemic all her life and in fact underwent hysterectomy for the same reason 12     years ago. She has previous history of intravenous iron infusion during her     pregnancies many many years ago. She has been evaluated with colonoscopy on 2     occasions and was found to have polyps which were removed. There is no family hi    story of blood disorder.            Her last colonoscopy was in 2019-1 polyp was removed it was benign.      She is up-to-date with her mammogram having had it done this year which was     negative.      She denies loss of appetite, loss of weight, night sweats, fevers, change in     bowel or bladder habits.            Jessica is here for follow-up of her anemia.  She is transferred care to a new     primary care provider Dr. Hinson.       Currently she offers no specific complaints except for mild fatigue.      I reviewed her laboratory investigations from 2018 and they are as follows:      Iron 55 TIBC 329% saturation 17% transferrin 230 ferritin 207 B12 587 folate 20     [place] creatinine 1.03; total protein/albumin 7.7/3.7-globulin elevated at 4.0      WBC  19.2 hemoglobin 11.4 hematocrit 34.7 platelet count 390,000 MCV 72.9 RBC 4.8    ANC 10.5            2019:      Total protein/albumin 7.9/4.5-normal globulin      Creatinine 0.7            HPI - Hematology Interim      Ms. Díaz has not noted any new symptoms.  She continues to take her iron     supplement without difficulty.  She has not noted any new GI symptoms.  She does    not report any palpitations or orthostatic dizziness.  She is maintaining her     usual activities.      In review, she had a colonoscopy in the last year showing polyps.  She does not     recall having an EGD done in the past.  She does not note any melena.  She does     tell me that her hemoglobin has been normal in the past but none of the values     recorded in the electronic record show a normal hemoglobin or a normal MCV.            Most Recent Lab Findings      Laboratory Tests      8/15/19 08:51            Labs from 2019 showed hemoglobin 10.10 with MCV 75 with no M protein on     electrophoresis      Iron saturation mildly decreased at 16% with iron 53 and TIBC 329 with     transferrin 230 along with ferritin normal at 172            PAST, FAMILY   Past Medical History      Past Medical History:  Arthritis, Diabetes Type 2, High Cholesterol,     Hypertension, Short of Air, Thyroid Disease      Other PMH:        Hypertension, obesity, abnormal ECG, allergic rhinitis, anemia, arthritis,      asthma.      Had stem cells instilled in both knee joints  for arthritis            Past Surgical History      Appendectomy, Cholecystectomy, Hysterectomy            Family History            Mother-MI at age 52      Father- at 76 of old age      Has 1 brother with allergies and asthma; one sister with asthma      Has 3 children and 3 grandchildren who are all healthy.            Social History      Lives independently:  Yes            Tobacco Use      Tobacco status:  Former smoker            Alcohol Use      Alcohol intake:  None             Substance Use      Substance use:  Denies use            REVIEW OF SYSTEMS      General:  Denies: Appetite Change, Fatigue, Fever, Night Sweats, Weight Gain,     Weight Loss      Eye:  Denies: Blurred Vision, Corrective Lenses, Diplopia, Eye Irritation, Eye     Pain, Eye Redness, Spots in Vision, Vision Loss      ENT:  Denies: Headache, Hearing Loss, Hoarseness, Seizures, Sinus Congestion,     Sore Throat      Cardiovascular:  Denies: Chest Pain, Edema Ankles, Edema Legs, Irregular     Heartbeat, Palpitations      Respiratory:  Denies: Coughing Blood, Productive Cough, Shortness of Air,     Wheezing      Gastrointestinal:  Admits: Nausea;          Denies: Bloody Stools, Constipation, Diarrhea, Frequent Heartburn, Problem     Swallowing, Tarry Stools, Unable to Control Bowels, Vomiting      Genitourinary (female):  Denies: Blood in Urine, Decrease Urine Stream, Frequent    Urination, Incontinence, Painful Urination      Musculoskeletal:  Denies: Back Pain, Leg Cramps, Muscle Pain, Muscle Weakness,     Painful Joints, Swollen Joints      Integumentary:  Denies: Bleeds Easily, Bruises Easily, Hair Changes, Jaundice,     Lesions, Mole Changes, Nail Changes, Pigment Changes, Rash, Skin Discoloration      Neurologic:  Denies: Dizziness, Fainting, Numbness\Tingling, Paralysis, Seizures      Psychiatric:  Denies: Anxiety, Confused, Depression, Disoriented, Memory Loss      Endocrine:  Denies: Cold Intolerance, Diabetes, Excessive Sweating, Excessive     Thirst, Excessive Urination, Heat Intolerance, Flushing, Hyperthyroidism,     Hypothyroidism      Hematologic/Lymphatic:  Denies: Bruising, Bleeding, Enlarged Lymph Nodes,     Recurrent Infections, Transfusions      Reproductive:  Denies: Menopause, Heavy Periods, Pregnant, Still Menstruating            VITAL SIGNS AND SCORES      Vitals      Height 5 ft 1.85 in / 157.1 cm      Weight 208 lbs 8.883 oz / 94.6 kg      BSA 1.94 m2      BMI 38.3 kg/m2      Temperature  99.3 F / 37.39 C - Temporal      Pulse 77      Blood Pressure 179/94 Sitting, Left Arm      Pulse Oximetry 97%, RM AIR            Pain Score      Experiencing any pain?:  Yes      Pain Scale Used:  Numerical      Pain Intensity:  3            Fatigue Score      Experiencing any fatigue?:  No      Fatigue (0-10 scale):  0 (none)            EXAM      General Appearance:  Positive for: Alert;          Negative for: Acute Distress      Eye:  Positive for: Anicteric Sclerae, Reactive to Light, Size      HEENT:  Positive for: Oropharynx clear;          Negative for: Erythema, Exudates, Pallor      Other      No vascular abnormalities      Neck:  Positive for: Full ROM, Supple;          Negative for: Masses      Respiratory:  Positive for: CTAB, Normal Respiratory Effort;          Negative for: Crackles, Rales      Abdomen/Gastro:  Positive for: Soft;          Negative for: Distention, Hepatosplenomegaly, Tenderness      Cardiovascular:  Positive for: RRR;          Negative for: Gallop, Murmur      Skin:  Negative for: Lesions, Rash, Ulcers      Lower Extremities:  Positive for: Normal Gait and Station;          Negative for: Deformities, Edema      Upper Extremities:  Negative for: Clubbing, Digital Cyanosis, Digital Ischemia      Lymphatic:  Negative for: Axillary, Cervical, Supraclavicular            PREVENTION      Hx Influenza Vaccination:  No      Influenza Vaccine Declined:  No      2 or More Falls Past Year?:  No      Fall Past Year with Injury?:  No      Hx Pneumococcal Vaccination:  No      Encouraged to follow-up with:  PCP regarding preventative exams.      Chart initiated by      OSWALD RAMOS CMA            ALLERGY/MEDS      Allergies      Coded Allergies:             DIPHENHYDRAMINE (Verified  Allergy, Unknown, PT CANT REMEMBER, 8/27/19)           PENICILLINS (Verified  Allergy, Unknown, 8/27/19)           SULFA (SULFONAMIDE ANTIBIOTICS) (Verified  Allergy, Unknown, itch, 8/27/19)            Medications       Last Reconciled on 7/5/18 11:01 by BAHMAN MONTENEGRO MD      Azithromycin (Azithromycin) 250 Mg Tablet      250 MG PO ASDIR, #6 TAB         Prov: HARRIS MYERS cfe         7/30/19       Benzonatate (Benzonatate) 200 Mg Capsule      200 MG PO Q8H PRN for COUGH, #21 CAP         Prov: HARRIS MYERS cfe         7/30/19       Neb-Budesonide (Pulmicort) 0.25 Mg/2 Ml Ampul.neb      0.25 MG INH RTBID for 30 Days, #60 NEB         Prov: MYERSHARRIS cfe         7/30/19       Tolterodine Tartrate (Detrol) Unknown Strength Tablet      PO BID, TAB         Reported         7/30/19       Rosuvastatin Calcium (Crestor*) Unknown Strength Tablet      PO HS, #15 TAB 0 Refills         Reported         7/30/19       Docusate Sodium (Colace) Unknown Strength Capsule      PO BID, #60 CAP 0 Refills         Reported         7/30/19       Ferrous Sulfate (Iron Sulfate*) Unknown Strength Tablet      PO BID, #60 TAB 0 Refills         Reported         7/30/19       Multivitamins (Multi-Vitamin) Unknown Strength Tablet      PO QDAY, #30 TAB 0 Refills         Reported         7/30/19       MDI-Ipratropium/Albuterol (Combivent*) 200 Puffs/Inh Inh      1 - 2 PUFF IH QID, AER 0 Refills         Reported         8/3/10       Calcium Carbonate/Vitamin D3 (Calcium 500 + D Tablet) 1 Each Tablet      1 EACH PO BID, 0 Refills         Reported         8/3/10       Neb-Levalbuterol (Xopenex) 1.25 Mg Nebu      1.25 MG NEB PRN, NEB 0 Refills         Reported         8/3/10       Nitroglycerin (Nitroquick*) 0.4 Mg Tab.subl      0.4 MG SL PRN, 0 Refills         Reported         8/3/10       Mometasone Furoate (Nasonex Spray) 17 Gm Naspr      1 SPRAYS NA QDAY, AER 0 Refills         Reported         8/3/10       Triamterene/HCTZ (Maxzide 75/50 Mg*) 1 Tab Tablet      1 TAB PO QDAY, TAB 0 Refills         Reported         6/24/09      Medications Reviewed:  No Changes made to meds            IMPRESSION/PLAN      Impression      Chronic microcytic anemia.   She does have a decreased iron saturation but a     normal ferritin and I am not sure if this is an atypical presentation of iron     deficiency or if it is in fact a mild hemoglobinopathy such as thalassemia     trait.            Diagnosis      Abnormal complete blood count - R79.89            Anemia - D64.9            Notes      New Diagnostics      * HEMOGLOBIN ELECTROPHORESIS, Routine         Dx: Abnormal complete blood count - R79.89      * Reticulocyte Count, Routine         Dx: Abnormal complete blood count - R79.89      * CBC With Auto Diff, Routine         Dx: Abnormal complete blood count - R79.89      * Stool Occult Blood, Routine         Dx: Abnormal complete blood count - R79.89            Plan      1.  I have ordered additional labs today occluding a hemoglobin electrophoresis     along with a CBC, reticulocyte count, LDH, repeat iron studies and I have asked     her to check a stool for occult blood.      2.  I do want her to continue her oral iron while we are evaluating further.      3.  I will see her back in a few weeks.  We can decide then about intravenous     iron to check her response versus further hemoglobin testing to look for any     hemoglobinopathy.                 Disclaimer: Converted document may not contain table formatting or lab diagrams. Please see [employer] for the authenticated document.